# Patient Record
Sex: FEMALE | Race: WHITE | NOT HISPANIC OR LATINO | Employment: UNEMPLOYED | URBAN - METROPOLITAN AREA
[De-identification: names, ages, dates, MRNs, and addresses within clinical notes are randomized per-mention and may not be internally consistent; named-entity substitution may affect disease eponyms.]

---

## 2023-06-07 ENCOUNTER — TELEPHONE (OUTPATIENT)
Dept: PRIMARY CARE | Facility: CLINIC | Age: 63
End: 2023-06-07
Payer: COMMERCIAL

## 2023-06-07 NOTE — TELEPHONE ENCOUNTER
Former pt of Dr. Deras. Pt no longer wants to see that Dr. She would like to see you, but has several questions.    Are you taking new pts?    What is the cost of a visit (new pt)?    She has several health problems and she prefers holistic remedies, not pills. Are you willing to work with her on that?    Her  is hard of hearing. Will you be able to speak loudly and clearly for him to hear and understand you?    How old are you? Are you planning to leave or retire soon?    Please advise.

## 2023-09-27 PROBLEM — K62.5 ANAL BLEEDING: Status: ACTIVE | Noted: 2023-09-27

## 2023-09-27 PROBLEM — G56.01 CARPAL TUNNEL SYNDROME OF RIGHT WRIST: Status: ACTIVE | Noted: 2023-09-27

## 2023-09-27 PROBLEM — G56.02 CARPAL TUNNEL SYNDROME OF LEFT WRIST: Status: ACTIVE | Noted: 2023-09-27

## 2023-09-27 RX ORDER — ERGOCALCIFEROL 1.25 MG/1
1 CAPSULE ORAL
COMMUNITY
Start: 2018-04-04 | End: 2023-11-14

## 2023-09-27 RX ORDER — NYSTATIN 100MM UNIT
POWDER (EA) MISCELLANEOUS
Status: ON HOLD | COMMUNITY
Start: 2018-04-19 | End: 2023-11-22 | Stop reason: ALTCHOICE

## 2023-09-27 RX ORDER — CITALOPRAM 10 MG/1
1.5 TABLET ORAL DAILY
COMMUNITY
Start: 2018-04-04 | End: 2023-10-03 | Stop reason: WASHOUT

## 2023-10-03 ENCOUNTER — OFFICE VISIT (OUTPATIENT)
Dept: HEMATOLOGY/ONCOLOGY | Facility: CLINIC | Age: 63
End: 2023-10-03
Payer: COMMERCIAL

## 2023-10-03 VITALS
DIASTOLIC BLOOD PRESSURE: 67 MMHG | OXYGEN SATURATION: 94 % | HEART RATE: 65 BPM | BODY MASS INDEX: 35.79 KG/M2 | WEIGHT: 222.66 LBS | TEMPERATURE: 97.7 F | SYSTOLIC BLOOD PRESSURE: 119 MMHG | RESPIRATION RATE: 16 BRPM | HEIGHT: 66 IN

## 2023-10-03 DIAGNOSIS — E07.89 THYROID MASS OF UNCLEAR ETIOLOGY: Primary | ICD-10-CM

## 2023-10-03 DIAGNOSIS — C91.10 CLL (CHRONIC LYMPHOCYTIC LEUKEMIA) (MULTI): ICD-10-CM

## 2023-10-03 PROCEDURE — 1036F TOBACCO NON-USER: CPT | Performed by: INTERNAL MEDICINE

## 2023-10-03 PROCEDURE — 99213 OFFICE O/P EST LOW 20 MIN: CPT | Performed by: INTERNAL MEDICINE

## 2023-10-03 PROCEDURE — 99203 OFFICE O/P NEW LOW 30 MIN: CPT | Performed by: INTERNAL MEDICINE

## 2023-10-03 RX ORDER — MONTELUKAST SODIUM 10 MG/1
10 TABLET ORAL DAILY
COMMUNITY
Start: 2023-09-25

## 2023-10-03 RX ORDER — CITALOPRAM 20 MG/1
20 TABLET, FILM COATED ORAL DAILY
COMMUNITY
Start: 2023-09-25

## 2023-10-03 ASSESSMENT — COLUMBIA-SUICIDE SEVERITY RATING SCALE - C-SSRS
1. IN THE PAST MONTH, HAVE YOU WISHED YOU WERE DEAD OR WISHED YOU COULD GO TO SLEEP AND NOT WAKE UP?: NO
2. HAVE YOU ACTUALLY HAD ANY THOUGHTS OF KILLING YOURSELF?: NO
6. HAVE YOU EVER DONE ANYTHING, STARTED TO DO ANYTHING, OR PREPARED TO DO ANYTHING TO END YOUR LIFE?: NO

## 2023-10-03 ASSESSMENT — ENCOUNTER SYMPTOMS
OCCASIONAL FEELINGS OF UNSTEADINESS: 0
LOSS OF SENSATION IN FEET: 0
DEPRESSION: 0

## 2023-10-03 ASSESSMENT — PATIENT HEALTH QUESTIONNAIRE - PHQ9
1. LITTLE INTEREST OR PLEASURE IN DOING THINGS: NOT AT ALL
SUM OF ALL RESPONSES TO PHQ9 QUESTIONS 1 AND 2: 0
2. FEELING DOWN, DEPRESSED OR HOPELESS: NOT AT ALL

## 2023-10-03 ASSESSMENT — PAIN SCALES - GENERAL: PAINLEVEL: 0-NO PAIN

## 2023-10-03 NOTE — PATIENT INSTRUCTIONS
Please call our office for any CLL concerns or if your PCP should advise a specialist appointment.

## 2023-10-03 NOTE — PROGRESS NOTES
"Patient ID: Amita Ponce is a 63 y.o. female.  Referring Physician: No referring provider defined for this encounter.  Primary Care Provider: Aydee Deras MD      Subjective    HPI  63-year-old female presented for first time visit after 4 years lost to follow-up.  She was diagnosed with chronic lymphocytic leukemia in 2012 and was treated by Dr. Cadet and at [facility] over that entire..  Time.  Clinically as per history she was deemed Echavarria stage 0 and management was expectant and patient has been on all of his observation.  Her most recent WBC performed August 2023 she was 15.4.  She has not had a bone marrow biopsy no lymph node biopsy and patient is a strong advocate for holistic medicine and has been to see certain holistic and naturopathic practitioner practitioners.  In December 2022 patient noticed right-sided neck lump which over the ensuing months has increased in size.  She presents today because this mass now is impairing breathing and swallowing.    I have reviewed patient's CT scan of the neck which shows cystic thyroid lesions lymph nodes in the mediastinum with decrease in size.  Clinically therefore patient has a thyroid mass/thyroid lesion and patient has been referred to otorhinolaryngology for biopsy and possible surgical management if so indicated.  She is to return to heme-onc on a as needed basis.    Review of Systems - Oncology     Difficulty swallowing and breathing but not significantly impaired.  External mass right side of neck measuring at least 10 cm on by 10 cm.    Respiratory good air entry.    Abdomen is soft nontender no palpable masses.    CNS alert and oriented to time place and person cranial nerve function intact sensorimotor function is intact.    Objective   BSA: 2.17 meters squared  /67 (BP Location: Left arm)   Pulse 65   Temp 36.5 °C (97.7 °F)   Resp 16   Ht 1.684 m (5' 6.3\")   Wt 101 kg (222 lb 10.6 oz)   SpO2 94%   BMI 35.62 kg/m²     Family History "   Problem Relation Name Age of Onset    Lung cancer Mother      Colon cancer Mother      Colon cancer Maternal Grandmother       Oncology History    No history exists.   See HPI.  Diagnosed with chronic lymphocytic leukemia in 2012.  Echavarria stage 0.  No biopsy no bone marrow biopsy no flow cytometric evidence of diagnosis.  Management has been expectant and watchful waiting.  Amita Ponce  reports that she has quit smoking. Her smoking use included cigarettes. She has never been exposed to tobacco smoke. She has never used smokeless tobacco.  She  reports no history of alcohol use.  She  reports no history of drug use.    Physical Exam  Constitutional:       Appearance: Normal appearance. She is normal weight.   HENT:      Head: Normocephalic and atraumatic.      Right Ear: Tympanic membrane normal.      Left Ear: Tympanic membrane normal.      Nose: Nose normal.      Mouth/Throat:      Mouth: Mucous membranes are moist.   Eyes:      Extraocular Movements: Extraocular movements intact.      Pupils: Pupils are equal, round, and reactive to light.   Neck:      Comments: Patient with a large 10 cm right-sided thyroid mass.  Patient complains of some impairment of swallowing and breathing.  Cystic in nature.  Easily mobile and not tethered to the skin.  Unable to palpate surrounding lymph nodes.  Clinically most consistent with a thyroid cystic mass.  Referral to ENT surgery  Cardiovascular:      Rate and Rhythm: Normal rate and regular rhythm.   Pulmonary:      Effort: Pulmonary effort is normal.      Breath sounds: Normal breath sounds.   Abdominal:      General: Abdomen is flat.      Palpations: Abdomen is soft.   Musculoskeletal:         General: Normal range of motion.   Skin:     General: Skin is warm and dry.   Neurological:      Mental Status: She is alert.       Performance Status:  Symptomatic; fully ambulatory    Assessment/Plan    63-year-old female with a neck mass history of CLL.  Clinically thyroid cystic  mass.     Patient with a large 10 cm right-sided thyroid mass.  Evaluation because of previous history of CLL.  Is negative for evidence of neoplastic lymphoid progression.  CT of the neck is negative for evidence of increased size of lymph nodes and white cell count is also well within normal with no evidence of progression.    Patient is being referred to ENT for biopsy and possible surgical intervention.  Patient is to return to oncology on a as needed basis as patient is history in regard to follow-up is rather unreliable.             RTC PRN    Halie Howard MD

## 2023-10-10 NOTE — PROGRESS NOTES
HPI  Amita Hoang a 63 y.o.old female referred to me today by aHlie Howard MD for further evaluation and treatment of a large right neck mass. She has a history of CLL and was referred by her oncologist for follow up and biopsy.  I personally reviewed her CT from 9/8/23 which shows a new cystic mass which measures 5.2x4.1x5.6cm.  Contained within the cystic neck mass there is an enhancing nodule measuring 1.2 x 0.8 x 1.3 cm. The inferior component of the mass  either abuts or arises from the right thyroid lobe. The thyroid gland is diffusely heterogeneous, right greater than left but can't see a discrete nodule.  There is an additional cystic appearing node at level 2 on the right measuring 1.5 x 0.8 cm. There are prominent lymph nodes bilaterally likely due to her CLL which is unchanged compared to a CT from 10yrs ago 2012.      She first noticed a small nodule in her right neck in January, reports it has gotten bigger since. Associated symptoms include pressure, globus sensation, throat pain, deeper voice, and discomfort when turning head to the left. She reports her mother had a thyroidectomy as a young adult, but she is not sure why.     PMH  Past Medical History:   Diagnosis Date    Personal history of leukemia     History of chronic lymphocytic leukemia    Unspecified visual loss     Vision loss        PSH  Past Surgical History:   Procedure Laterality Date    GALLBLADDER SURGERY  04/04/2018    Gallbladder Surgery    HEMORRHOID SURGERY  04/04/2018    Hemorrhoidectomy    HYSTERECTOMY  04/04/2018    Hysterectomy        ROS  Review of Systems   Constitutional:  Positive for fatigue.   Respiratory:  Negative for choking and stridor.    Musculoskeletal:  Positive for neck pain.   Neurological:  Negative for dizziness.        Deeper voice   Psychiatric/Behavioral:  The patient is nervous/anxious.         PE  PHYSICAL EXAMINATION:  Constitutional:  No acute distress  Voice:  No hoarseness or other  abnormality  Respiration:  Breathing comfortably, no stridor  Cardiovascular:  No clubbing/cyanosis/edema in hands  Eyes:  EOM intact, sclera normal  Neuro:  Alert and oriented times 3, Cranial nerves II-XII grossly intact and symmetric bilaterally  Head and Face:  Symmetric facial features, no masses or lesions,  Salivary Glands:  Parotid and submandibular glands normal bilaterally  Right Ear:  Normal external ear, external auditory canal, and TM to otoscopy, normal hearing to whispered voice.  Left Ear: Normal external ear, external auditory canal, and TM to otoscopy, normal hearing to whispered voice.  Nose:  External nose midline, anterior rhinoscopy is normal with limited visualization to the anterior aspect of the interior turbinates, no bleeding or drainage, no lesions  Oral Cavity/Oropharynx/Lips:  Normal mucous membranes, normal floor of mouth/tongue/OP, no masses or lesions, missing several teeth   Pharynx: no masses or lesions  Neck/Lymph:  5cm neck mass right neck, firm, not mobile except that it elevates when swallowing, soft, fluctuant (fluid filled), mildly tender to palpation,   Skin:  Neck skin is without scar or injury  Psych:  Alert and oriented with appropriate mood and affect      ASSESSMENT AND PLAN  Problem List Items Addressed This Visit          ENT    Neck mass    Current Assessment & Plan     - large right cystic neck mass  - US thyroid, US FNA of R neck mass and thyroid ordered          Relevant Orders    US thyroid    US guided thyroid biopsy    IR biopsy neck lymph node     Other Visit Diagnoses       Thyroid mass of unclear etiology        Relevant Orders    US thyroid    US guided thyroid biopsy    IR biopsy neck lymph node               Recs:  - Thyroid ultrasound and US-guided FNA of thyroid as well as cystic mass   - Return for follow up after biopsy   - Will need flexible laryngoscopy next visit for pre op eval of vocal cords     Concern for possible thyroid cancer, recommend plan  as above.  I did explain the diagnosis, possible alternative diagnoses, and the plans for evaluation and treatment of the problem.  I answered all of the patients questions.  They did appear to understand and confirmed this, and do agree to proceed as outlined above.      Vandana Dalton MD    Head & Neck Surgical Oncology & Reconstruction  Department of Otolaryngology - Head and Neck Surgery      By signing my name below, I, Snehal Brennanibe, attest that this documentation has been prepared under the direction and in the presence of Dr. Vandana Dalton MD.     All medical record entries made by the Scribe were at my direction and personally dictated by me, Dr. Vandana Dalton. I have reviewed the chart and agree that the record accurately reflects my personal performance of the history, physical exam, discussion and plan.

## 2023-10-11 ENCOUNTER — OFFICE VISIT (OUTPATIENT)
Dept: OTOLARYNGOLOGY | Facility: HOSPITAL | Age: 63
End: 2023-10-11
Payer: COMMERCIAL

## 2023-10-11 VITALS — HEIGHT: 65 IN | BODY MASS INDEX: 40.47 KG/M2 | TEMPERATURE: 97.3 F | WEIGHT: 242.9 LBS

## 2023-10-11 DIAGNOSIS — R22.1 NECK MASS: ICD-10-CM

## 2023-10-11 DIAGNOSIS — E07.89 THYROID MASS OF UNCLEAR ETIOLOGY: ICD-10-CM

## 2023-10-11 PROCEDURE — 99214 OFFICE O/P EST MOD 30 MIN: CPT | Performed by: OTOLARYNGOLOGY

## 2023-10-11 PROCEDURE — 1036F TOBACCO NON-USER: CPT | Performed by: OTOLARYNGOLOGY

## 2023-10-11 PROCEDURE — 99204 OFFICE O/P NEW MOD 45 MIN: CPT | Performed by: OTOLARYNGOLOGY

## 2023-10-11 ASSESSMENT — PATIENT HEALTH QUESTIONNAIRE - PHQ9
1. LITTLE INTEREST OR PLEASURE IN DOING THINGS: NOT AT ALL
2. FEELING DOWN, DEPRESSED OR HOPELESS: NOT AT ALL
SUM OF ALL RESPONSES TO PHQ9 QUESTIONS 1 & 2: 0

## 2023-10-11 ASSESSMENT — ENCOUNTER SYMPTOMS
NECK PAIN: 1
DIZZINESS: 0
CHOKING: 0
FATIGUE: 1
STRIDOR: 0
NERVOUS/ANXIOUS: 1

## 2023-10-11 NOTE — PATIENT INSTRUCTIONS
Dear Amita Ponce    Welcome to Dr. [facility],    Dr. Dalton is a Head and neck oncologist and reconstructive surgeon. This means that she specializes in caring for patients with complex head and neck problems such as cancer, however,you may be seeing her for another reason.      Dr. Dalton's office number is [phone] option #2.  While you may see her at a satellite office, she has a team committed to help meet your healthcare needs at [facility].  This number listed, is the most direct way to communicate with her office.      Dr. Dalton's  answers the office phone from 8am-4pm Monday-Friday.  She can help you with many general questions and information.  Questions that she may not be able to answer will be directed to the appropriate staff.  You will need to leave a message and someone from the team will call you back.     Chloe and Chari are Dr. Dalton's primary nurses. They can both be reached by calling the office as well. Chloe is in clinic on Mondays and Wednesdays with Dr. Dalton. Chloe is out of the office on Tuesday's and Friday's, but Chrai is in the office on Tuesdays and Fridays and will be covering all patient concerns. Please be mindful that all non urgent calls will be returned within 24 hours of the call.     Sometimes, other team members will also be involved in your care.  These people may include dieticians, social workers, speech therapists, medical oncologists, or radiation oncologists.  Dr. Dalton will provide these referrals for you as needed.      For your connivence, Dr. Dalton sees patients at several [facility] locations. These locations are [facility], [facility], and [facility] the [facility].  While we try to make your appointment as convenient as possible, occasionally a visit to another location may be necessary to provide you with the best care.      We look  forward to working with you to meet your healthcare goals.    Dr. Dalton evaluated you today.    Your care plan is outlined below:  -- Dr. Dalton recommends a thyroid US. This can be scheduled on your way out today, by calling [phone], or by accessing the order on your my chart. Please schedule this test at your earliest convenience.   -- Dr. Dalton recommends a biopsy of the thyroid nodule and R neck mass. Please call interventional radiology to schedule the biopsy. The phone number is [phone].   -- Follow up with Dr. Dalton after your biopsy.  This appointment was scheduled at the end of your visit today.  If you need to reschedule, please call the office at [phone].  Please keep in mind that last minute cancellations often result in delayed follow-up appointments.     General appointment line please call [phone]  For general questions or scheduling issues please call [phone] option #2   For medical questions or surgery scheduling please call [phone] on Mondays, Wednesdays and Thursdays or [phone] on Tuesdays and Fridays. Please be sure to leave a voice mail or your call will not be able to be returned.     Dr. Dalton makes every effort to run on time for your appointments.  Therefore, if you are more than 30 minutes late for your appointment, unrelated to a scan or another appointment such as chemotherapy or radiation, your appointment will need to be rescheduled to another day.  We appreciate your understanding.

## 2023-10-11 NOTE — LETTER
October 11, 2023     Halie Howard MD  [address] [facility] [address]    Patient: Amita Ponce   YOB: 1960   Date of Visit: 10/11/2023       Dear Dr. Halie Howard MD:    Thank you for referring Amita Ponce to me for evaluation. Below are my notes for this consultation.  If you have questions, please do not hesitate to call me. I look forward to following your patient along with you.       Sincerely,     Vandana Dalton MD      CC: No Recipients  ______________________________________________________________________________________    HPI  Amita Ponceis a 63 y.o.old female referred to me today by Halie Howard MD for further evaluation and treatment of a large right neck mass. She has a history of CLL and was referred by her oncologist for follow up and biopsy.  I personally reviewed her CT from 9/8/23 which shows a new cystic mass which measures 5.2x4.1x5.6cm.  Contained within the cystic neck mass there is an enhancing nodule measuring 1.2 x 0.8 x 1.3 cm. The inferior component of the mass  either abuts or arises from the right thyroid lobe. The thyroid gland is diffusely heterogeneous, right greater than left but can't see a discrete nodule.  There is an additional cystic appearing node at level 2 on the right measuring 1.5 x 0.8 cm. There are prominent lymph nodes bilaterally likely due to her CLL which is unchanged compared to a CT from 10yrs ago 2012.      She first noticed a small nodule in her right neck in January, reports it has gotten bigger since. Associated symptoms include pressure, globus sensation, throat pain, deeper voice, and discomfort when turning head to the left. She reports her mother had a thyroidectomy as a young adult, but she is not sure why.     PMH  Past Medical History:   Diagnosis Date   • Personal history of leukemia     History of chronic lymphocytic leukemia   • Unspecified visual loss     Vision loss        PSH  Past  Surgical History:   Procedure Laterality Date   • GALLBLADDER SURGERY  04/04/2018    Gallbladder Surgery   • HEMORRHOID SURGERY  04/04/2018    Hemorrhoidectomy   • HYSTERECTOMY  04/04/2018    Hysterectomy        ROS  Review of Systems   Constitutional:  Positive for fatigue.   Respiratory:  Negative for choking and stridor.    Musculoskeletal:  Positive for neck pain.   Neurological:  Negative for dizziness.        Deeper voice   Psychiatric/Behavioral:  The patient is nervous/anxious.         PE  PHYSICAL EXAMINATION:  Constitutional:  No acute distress  Voice:  No hoarseness or other abnormality  Respiration:  Breathing comfortably, no stridor  Cardiovascular:  No clubbing/cyanosis/edema in hands  Eyes:  EOM intact, sclera normal  Neuro:  Alert and oriented times 3, Cranial nerves II-XII grossly intact and symmetric bilaterally  Head and Face:  Symmetric facial features, no masses or lesions,  Salivary Glands:  Parotid and submandibular glands normal bilaterally  Right Ear:  Normal external ear, external auditory canal, and TM to otoscopy, normal hearing to whispered voice.  Left Ear: Normal external ear, external auditory canal, and TM to otoscopy, normal hearing to whispered voice.  Nose:  External nose midline, anterior rhinoscopy is normal with limited visualization to the anterior aspect of the interior turbinates, no bleeding or drainage, no lesions  Oral Cavity/Oropharynx/Lips:  Normal mucous membranes, normal floor of mouth/tongue/OP, no masses or lesions, missing several teeth   Pharynx: no masses or lesions  Neck/Lymph:  5cm neck mass right neck, firm, not mobile except that it elevates when swallowing, soft, fluctuant (fluid filled), mildly tender to palpation,   Skin:  Neck skin is without scar or injury  Psych:  Alert and oriented with appropriate mood and affect      ASSESSMENT AND PLAN  Problem List Items Addressed This Visit          ENT    Neck mass    Current Assessment & Plan     - large right  cystic neck mass  - US thyroid, US FNA of R neck mass and thyroid ordered          Relevant Orders    US thyroid    US guided thyroid biopsy    IR biopsy neck lymph node     Other Visit Diagnoses       Thyroid mass of unclear etiology        Relevant Orders    US thyroid    US guided thyroid biopsy    IR biopsy neck lymph node               Recs:  - Thyroid ultrasound and US-guided FNA of thyroid as well as cystic mass   - Return for follow up after biopsy   - Will need flexible laryngoscopy next visit for pre op eval of vocal cords     Concern for possible thyroid cancer, recommend plan as above.  I did explain the diagnosis, possible alternative diagnoses, and the plans for evaluation and treatment of the problem.  I answered all of the patients questions.  They did appear to understand and confirmed this, and do agree to proceed as outlined above.      Vandana Dalton MD    Head & Neck Surgical Oncology & Reconstruction  Department of Otolaryngology - Head and Neck Surgery      By signing my name below, I, Snehal Brennanibe, attest that this documentation has been prepared under the direction and in the presence of Dr. Vandana Dalton MD.     All medical record entries made by the Scribe were at my direction and personally dictated by me, Dr. Vandana Dalton. I have reviewed the chart and agree that the record accurately reflects my personal performance of the history, physical exam, discussion and plan.

## 2023-10-20 ENCOUNTER — PREP FOR PROCEDURE (OUTPATIENT)
Dept: RADIOLOGY | Facility: HOSPITAL | Age: 63
End: 2023-10-20

## 2023-10-20 ENCOUNTER — HOSPITAL ENCOUNTER (OUTPATIENT)
Dept: RADIOLOGY | Facility: HOSPITAL | Age: 63
Discharge: HOME | End: 2023-10-20
Payer: COMMERCIAL

## 2023-10-20 VITALS
DIASTOLIC BLOOD PRESSURE: 70 MMHG | HEART RATE: 66 BPM | RESPIRATION RATE: 16 BRPM | SYSTOLIC BLOOD PRESSURE: 108 MMHG | TEMPERATURE: 97.7 F | OXYGEN SATURATION: 95 %

## 2023-10-20 DIAGNOSIS — E07.89 THYROID MASS OF UNCLEAR ETIOLOGY: ICD-10-CM

## 2023-10-20 DIAGNOSIS — R22.1 NECK MASS: ICD-10-CM

## 2023-10-20 PROCEDURE — 88305 TISSUE EXAM BY PATHOLOGIST: CPT | Performed by: PATHOLOGY

## 2023-10-20 PROCEDURE — 88173 CYTOPATH EVAL FNA REPORT: CPT | Performed by: PATHOLOGY

## 2023-10-20 PROCEDURE — 99152 MOD SED SAME PHYS/QHP 5/>YRS: CPT

## 2023-10-20 PROCEDURE — 76942 ECHO GUIDE FOR BIOPSY: CPT

## 2023-10-20 PROCEDURE — 76536 US EXAM OF HEAD AND NECK: CPT | Performed by: RADIOLOGY

## 2023-10-20 PROCEDURE — 60100 BIOPSY OF THYROID: CPT | Performed by: RADIOLOGY

## 2023-10-20 PROCEDURE — 88112 CYTOPATH CELL ENHANCE TECH: CPT | Mod: TC,MCY | Performed by: STUDENT IN AN ORGANIZED HEALTH CARE EDUCATION/TRAINING PROGRAM

## 2023-10-20 PROCEDURE — 77002 NEEDLE LOCALIZATION BY XRAY: CPT

## 2023-10-20 PROCEDURE — 10005 FNA BX W/US GDN 1ST LES: CPT

## 2023-10-20 PROCEDURE — 2500000004 HC RX 250 GENERAL PHARMACY W/ HCPCS (ALT 636 FOR OP/ED): Performed by: RADIOLOGY

## 2023-10-20 PROCEDURE — 88305 TISSUE EXAM BY PATHOLOGIST: CPT | Mod: TC,SUR | Performed by: STUDENT IN AN ORGANIZED HEALTH CARE EDUCATION/TRAINING PROGRAM

## 2023-10-20 PROCEDURE — 2720000007 HC OR 272 NO HCPCS

## 2023-10-20 PROCEDURE — 76942 ECHO GUIDE FOR BIOPSY: CPT | Performed by: RADIOLOGY

## 2023-10-20 PROCEDURE — 10160 PNXR ASPIR ABSC HMTMA BULLA: CPT

## 2023-10-20 PROCEDURE — 99152 MOD SED SAME PHYS/QHP 5/>YRS: CPT | Performed by: RADIOLOGY

## 2023-10-20 PROCEDURE — 76536 US EXAM OF HEAD AND NECK: CPT

## 2023-10-20 RX ORDER — MIDAZOLAM HYDROCHLORIDE 1 MG/ML
INJECTION INTRAMUSCULAR; INTRAVENOUS AS NEEDED
Status: COMPLETED | OUTPATIENT
Start: 2023-10-20 | End: 2023-10-20

## 2023-10-20 RX ORDER — FENTANYL CITRATE 50 UG/ML
INJECTION, SOLUTION INTRAMUSCULAR; INTRAVENOUS AS NEEDED
Status: COMPLETED | OUTPATIENT
Start: 2023-10-20 | End: 2023-10-20

## 2023-10-20 RX ADMIN — MIDAZOLAM HYDROCHLORIDE 1 MG: 1 INJECTION, SOLUTION INTRAMUSCULAR; INTRAVENOUS at 14:14

## 2023-10-20 RX ADMIN — FENTANYL CITRATE 50 MCG: 50 INJECTION, SOLUTION INTRAMUSCULAR; INTRAVENOUS at 14:08

## 2023-10-20 RX ADMIN — MIDAZOLAM HYDROCHLORIDE 1 MG: 1 INJECTION, SOLUTION INTRAMUSCULAR; INTRAVENOUS at 14:08

## 2023-10-20 RX ADMIN — FENTANYL CITRATE 50 MCG: 50 INJECTION, SOLUTION INTRAMUSCULAR; INTRAVENOUS at 14:14

## 2023-10-20 ASSESSMENT — PAIN SCALES - GENERAL
PAINLEVEL_OUTOF10: 0 - NO PAIN

## 2023-10-20 ASSESSMENT — PAIN - FUNCTIONAL ASSESSMENT
PAIN_FUNCTIONAL_ASSESSMENT: 0-10

## 2023-10-20 NOTE — PRE-PROCEDURE NOTE
Interventional Radiology Preprocedure Note    Indication for procedure: Diagnoses of Thyroid mass of unclear etiology and Neck mass were pertinent to this visit.    Relevant review of systems: NA    Relevant Labs:   Lab Results   Component Value Date    CREATININE 0.75 12/01/2020       Planned Sedation/Anesthesia: Moderate    Airway assessment: normal    Directed physical examination:    GEN: NAD, A&Ox3  CARD: RRR, normal S1 and S2, no MRG  PULM: CTAB  ABD: NTND  MSK: Full ROM  NEURO: Grossly Intact     Mallampati: II (hard and soft palate, upper portion of tonsils anduvula visible)    ASA Score: ASA 4 - Patient with severe systemic disease that is a constant threat to life    Benefits, risks and alternatives of procedure and planned sedation have been discussed with the patient and/or their representative. All questions answered and they agree to proceed.

## 2023-10-20 NOTE — Clinical Note
R thyroid bx performed. 2 core samples taken and ~35cc aspirate taken and sent to lab. Patient received 2mg versed, 100mcg fentanyl IVP for sedation.  Gauze and tegaderm placed R neck. No visible bleeding at site. VSS throughout procedure.

## 2023-10-20 NOTE — POST-PROCEDURE NOTE
Interventional Radiology Brief Postprocedure Note    Attending: Dr. Tara Peralta    Assistant: None    Diagnosis: Superior Left Thyroid Lobe Mass    Description of procedure: Using ultrasound guidance, a total of 3 passes were made with 25 gauge spinal needle to the right thyroid nodule. Subsequently, a total of 2 passes were made into the same nodule under ultrasound guidance using a 18 Gauge needle passed through a 17 gauge coaxial system. Additionally, approximately 35 mL of dark hemorrhagic fluid was aspirated from the mass. Scanning after each pass demonstrated no bleeding. Patient tolerated the procedure. Please refer to the full radiology report for further details.       Anesthesia:  Local    Complications: None    Estimated Blood Loss: minimal    Medications (Filter: Administrations occurring from 1356 to 1422 on 10/20/23) As of 10/20/23 1422      fentaNYL PF (Sublimaze) injection (mcg) Total dose:  100 mcg      Date/Time Rate/Dose/Volume Action       10/20/23  1408 50 mcg Given      1414 50 mcg Given               midazolam (Versed) injection (mg) Total dose:  2 mg      Date/Time Rate/Dose/Volume Action       10/20/23  1408 1 mg Given      1414 1 mg Given                       See detailed result report with images in PACS.    The patient tolerated the procedure well without incident or complication and is in stable condition.

## 2023-10-24 LAB
LABORATORY COMMENT REPORT: NORMAL
PATH REPORT.FINAL DX SPEC: NORMAL
PATH REPORT.GROSS SPEC: NORMAL
PATH REPORT.RELEVANT HX SPEC: NORMAL
PATH REPORT.TOTAL CANCER: NORMAL

## 2023-10-25 LAB
LABORATORY COMMENT REPORT: NORMAL
LABORATORY COMMENT REPORT: NORMAL
PATH REPORT.FINAL DX SPEC: NORMAL
PATH REPORT.GROSS SPEC: NORMAL
PATH REPORT.RELEVANT HX SPEC: NORMAL
PATH REPORT.TOTAL CANCER: NORMAL

## 2023-10-26 ENCOUNTER — TELEPHONE (OUTPATIENT)
Dept: OTOLARYNGOLOGY | Facility: HOSPITAL | Age: 63
End: 2023-10-26
Payer: COMMERCIAL

## 2023-10-26 NOTE — TELEPHONE ENCOUNTER
Called and reviewed her FNA results which were +papillary thyroid cancer.  She has follow up me next week.  We had already discussed the possibility that this was thyroid cancer.  We discussed the plan for thyroidectomy and neck dissection.  All questions answered.

## 2023-10-31 ASSESSMENT — ENCOUNTER SYMPTOMS
STRIDOR: 0
CHOKING: 0
NECK PAIN: 1
FATIGUE: 1
DIZZINESS: 0

## 2023-10-31 NOTE — PROGRESS NOTES
HPI  Amita Hoang a 63 y.o. female following up today.  Last seen 11/1/23.  Since last visit she had US guided FNA of her right neck mass which returned +papillary thyroid cancer. She is here to discuss next steps.  She is having more discomfort involving her neck.  She takes tylenol but there are times when it's severe.      History:  Dx: Papillary thyroid carcinoma  Dx: CLL  1/23: Noticed a small nodule in her right neck, inc size   9/23: CT neck +new cystic mass 5.2x4.1x5.6cm w/n +enhancing nodule 1.2 x 0.8 x 1.3 cm, abuts or arises from the right thyroid lobe. The thyroid gland is diffusely heterogeneous, right greater than left but can't see a discrete nodule.  There is an additional cystic appearing node at level 2 on the right measuring 1.5 x 0.8 cm.     ROS  Review of Systems   Constitutional:  Positive for fatigue.   Respiratory:  Negative for choking and stridor.    Musculoskeletal:  Positive for neck pain.   Neurological:  Negative for dizziness.        Deeper voice        PE  PHYSICAL EXAMINATION:  Constitutional:  No acute distress  Voice:  No hoarseness or other abnormality  Respiration:  Breathing comfortably, no stridor  Cardiovascular:  No clubbing/cyanosis/edema in hands  Eyes:  EOM intact, sclera normal  Neuro:  Alert and oriented times 3, Cranial nerves II-XII grossly intact and symmetric bilaterally  Head and Face:  Symmetric facial features, no masses or lesions,  Salivary Glands:  Parotid and submandibular glands normal bilaterally  Nose:  External nose midline, anterior rhinoscopy is normal with limited visualization to the anterior aspect of the interior turbinates, no bleeding or drainage, no lesions  Oral Cavity/Oropharynx/Lips:  MMM  Neck/Lymph:  5cm neck mass right neck, firm, not mobile except that it elevates when swallowing, soft, fluctuant (fluid filled), mildly tender to palpation,   Psych:  Alert and oriented with appropriate mood and affect      PROCEDURE NOTE:  Recommended  flexible nasopharyngoscopy & laryngoscopy.  Risks, benefits, personnel and alternatives were explained.  The patient wished to proceed.  S/he was re-identified.  PROCEDURE:  Flexible nasopharyngoscopy and laryngoscopy  PREOPERATIVE DIAGNOSIS: Thyroid cancer  POSTOPERATIVE DIAGNOSIS: Same as above, normal vocal fold mobility  INDICATIONS: Inability to tolerate mirror exam  ANESTHESIA: 4% lidocaine and 0.5% phenylephrine  PROCEDURE:  With the patient sitting upright topical anesthesia and vasoconstriction was applied with spray to the right side(s) of the nose.  After waiting an appropriate period of time for anesthesia/vasoconstriction to become effective, a flexible laryngoscope was passed through the right side(s) of the nose.  Nasopharynx, oropharynx, hypopharynx and larynx were examined.  FINDINGS:  The nasopharynx and oropharynx were normal without any lesions or masses visualized.  No masses or lesions were visualized at the base of tongue, vallecula, epiglottis, aryepiglottic folds, pyriform sinuses, and lateral pharyngeal walls.  True vocal fold movement was normal.  The patient's airway was widely patent with no evidence of obstruction.  Patient tolerated the procedure well, and there were no complications.      ASSESSMENT AND PLAN  Problem List Items Addressed This Visit       Neck mass    Current Assessment & Plan     Neck mass - +papillary thyroid cancer - see below, will require surgical excision         Papillary thyroid carcinoma (CMS/HCC) - Primary    Current Assessment & Plan     Papillary thyroid carcinoma with either extension off the thyroid to the right neck or right cervical lymph node metastasis.  Recommend proceeding with total thyroidectomy and right neck dissection.  Risks, benefits and alternatives were discussed including but not limited to neck scar, deformity/asymmetry, postoperative numbness, recurrent nerve weakness - both transient and permanent, hypocalcemia - both transient and  permanent, hoarseness, bleeding and infection.  I also reviewed the need for lifelong thyroid hormone supplementation.  Risks, benefits and alternatives were discussed at length regarding the above procedure(s).  The patient expressed verbal understanding of these and elected to sign a surgical consent today.  Follow up for surgery scheduling in the near future.   In addition the surgical procedure, perioperative expectations including an 2-3 stay and drain placement were all discussed.  The patient has elected to proceed and did sign a surgical consent today.  Follow up for surgery in the near future.     She will follow up with me after surgery.         Relevant Medications    traMADol (Ultram) 50 mg tablet    Other Relevant Orders    Case Request Operating Room: Thyroidectomy, Dissection Neck (Completed)          Recs:  - Thyroid ultrasound and US-guided FNA of thyroid as well as cystic mass   - Return for follow up after biopsy   - Will need flexible laryngoscopy next visit for pre op eval of vocal cords     Concern for possible thyroid cancer, recommend plan as above.  I did explain the diagnosis, possible alternative diagnoses, and the plans for evaluation and treatment of the problem.  I answered all of the patients questions.  They did appear to understand and confirmed this, and do agree to proceed as outlined above.      Vandana Dalton MD    Head & Neck Surgical Oncology & Reconstruction  Department of Otolaryngology - Head and Neck Surgery      By signing my name below, I, Jensen Brennan, attest that this documentation has been prepared under the direction and in the presence of Dr. Vandana Dalton MD.     All medical record entries made by the Scribe were at my direction and personally dictated by me, Dr. Vandana Dalton. I have reviewed the chart and agree that the record accurately reflects my personal performance of the history, physical exam, discussion and plan.     Procedures  ENT Physical  Exam

## 2023-11-01 ENCOUNTER — OFFICE VISIT (OUTPATIENT)
Dept: OTOLARYNGOLOGY | Facility: HOSPITAL | Age: 63
End: 2023-11-01
Payer: COMMERCIAL

## 2023-11-01 VITALS — WEIGHT: 244.7 LBS | BODY MASS INDEX: 40.77 KG/M2 | TEMPERATURE: 97.9 F | HEIGHT: 65 IN

## 2023-11-01 DIAGNOSIS — R22.1 NECK MASS: ICD-10-CM

## 2023-11-01 DIAGNOSIS — C73 PAPILLARY THYROID CARCINOMA (MULTI): Primary | ICD-10-CM

## 2023-11-01 PROCEDURE — 92511 NASOPHARYNGOSCOPY: CPT | Performed by: OTOLARYNGOLOGY

## 2023-11-01 PROCEDURE — 99214 OFFICE O/P EST MOD 30 MIN: CPT | Performed by: OTOLARYNGOLOGY

## 2023-11-01 PROCEDURE — 1036F TOBACCO NON-USER: CPT | Performed by: OTOLARYNGOLOGY

## 2023-11-01 RX ORDER — CETIRIZINE HYDROCHLORIDE 10 MG/1
10 TABLET ORAL DAILY PRN
COMMUNITY
Start: 2016-11-15 | End: 2023-12-21 | Stop reason: WASHOUT

## 2023-11-01 RX ORDER — CHOLECALCIFEROL (VITAMIN D3) 25 MCG
3000 TABLET ORAL
COMMUNITY
End: 2023-12-21 | Stop reason: WASHOUT

## 2023-11-01 ASSESSMENT — PATIENT HEALTH QUESTIONNAIRE - PHQ9
2. FEELING DOWN, DEPRESSED OR HOPELESS: NOT AT ALL
SUM OF ALL RESPONSES TO PHQ9 QUESTIONS 1 & 2: 0
1. LITTLE INTEREST OR PLEASURE IN DOING THINGS: NOT AT ALL

## 2023-11-03 ENCOUNTER — TELEPHONE (OUTPATIENT)
Dept: OPERATING ROOM | Facility: HOSPITAL | Age: 63
End: 2023-11-03
Payer: COMMERCIAL

## 2023-11-03 RX ORDER — TRAMADOL HYDROCHLORIDE 50 MG/1
50 TABLET ORAL EVERY 6 HOURS PRN
Qty: 56 TABLET | Refills: 0 | Status: SHIPPED | OUTPATIENT
Start: 2023-11-03 | End: 2023-11-25 | Stop reason: HOSPADM

## 2023-11-03 NOTE — TELEPHONE ENCOUNTER
Called patient back to let her know we would be moving her surgery to 11/22 as she requested. She also let me know that Dr. Dalton was supposed to call her in tramadol on Wednesday but never did. I let her know I would reach out and have the prescription called in and let her know on mychart. She also had some questions about radioactive iodine that I was unable to answer at this time and advised her to follow up with the team that would be treating her for that when it came time as I did not want to give her incorrect information. Patient in agreement with plan and has our number to call with further concerns.

## 2023-11-05 NOTE — ASSESSMENT & PLAN NOTE
Papillary thyroid carcinoma with either extension off the thyroid to the right neck or right cervical lymph node metastasis.  Recommend proceeding with total thyroidectomy and right neck dissection.  Risks, benefits and alternatives were discussed including but not limited to neck scar, deformity/asymmetry, postoperative numbness, recurrent nerve weakness - both transient and permanent, hypocalcemia - both transient and permanent, hoarseness, bleeding and infection.  I also reviewed the need for lifelong thyroid hormone supplementation.  Risks, benefits and alternatives were discussed at length regarding the above procedure(s).  The patient expressed verbal understanding of these and elected to sign a surgical consent today.  Follow up for surgery scheduling in the near future.   In addition the surgical procedure, perioperative expectations including an 2-3 stay and drain placement were all discussed.  The patient has elected to proceed and did sign a surgical consent today.  Follow up for surgery in the near future.     She will follow up with me after surgery.

## 2023-11-06 ENCOUNTER — APPOINTMENT (OUTPATIENT)
Dept: OTOLARYNGOLOGY | Facility: CLINIC | Age: 63
End: 2023-11-06
Payer: COMMERCIAL

## 2023-11-14 ENCOUNTER — TELEPHONE (OUTPATIENT)
Dept: OPERATING ROOM | Facility: HOSPITAL | Age: 63
End: 2023-11-14

## 2023-11-14 ENCOUNTER — PRE-ADMISSION TESTING (OUTPATIENT)
Dept: PREADMISSION TESTING | Facility: HOSPITAL | Age: 63
End: 2023-11-14
Payer: COMMERCIAL

## 2023-11-14 VITALS
HEART RATE: 65 BPM | DIASTOLIC BLOOD PRESSURE: 72 MMHG | BODY MASS INDEX: 39.05 KG/M2 | SYSTOLIC BLOOD PRESSURE: 114 MMHG | HEIGHT: 66 IN | WEIGHT: 243 LBS | TEMPERATURE: 98 F | OXYGEN SATURATION: 98 %

## 2023-11-14 DIAGNOSIS — Z01.818 PREOPERATIVE EXAMINATION: Primary | ICD-10-CM

## 2023-11-14 LAB
ABO GROUP (TYPE) IN BLOOD: NORMAL
ANION GAP SERPL CALC-SCNC: 10 MMOL/L (ref 10–20)
ANTIBODY SCREEN: NORMAL
BUN SERPL-MCNC: 15 MG/DL (ref 6–23)
CALCIUM SERPL-MCNC: 10.4 MG/DL (ref 8.6–10.6)
CHLORIDE SERPL-SCNC: 105 MMOL/L (ref 98–107)
CO2 SERPL-SCNC: 31 MMOL/L (ref 21–32)
CREAT SERPL-MCNC: 0.63 MG/DL (ref 0.5–1.05)
ERYTHROCYTE [DISTWIDTH] IN BLOOD BY AUTOMATED COUNT: 13.9 % (ref 11.5–14.5)
GFR SERPL CREATININE-BSD FRML MDRD: >90 ML/MIN/1.73M*2
GLUCOSE SERPL-MCNC: 76 MG/DL (ref 74–99)
HCT VFR BLD AUTO: 41.1 % (ref 36–46)
HGB BLD-MCNC: 13 G/DL (ref 12–16)
MCH RBC QN AUTO: 28.6 PG (ref 26–34)
MCHC RBC AUTO-ENTMCNC: 31.6 G/DL (ref 32–36)
MCV RBC AUTO: 91 FL (ref 80–100)
NRBC BLD-RTO: 0 /100 WBCS (ref 0–0)
PLATELET # BLD AUTO: 266 X10*3/UL (ref 150–450)
POTASSIUM SERPL-SCNC: 4.7 MMOL/L (ref 3.5–5.3)
RBC # BLD AUTO: 4.54 X10*6/UL (ref 4–5.2)
RH FACTOR (ANTIGEN D): NORMAL
SODIUM SERPL-SCNC: 141 MMOL/L (ref 136–145)
WBC # BLD AUTO: 14.5 X10*3/UL (ref 4.4–11.3)

## 2023-11-14 PROCEDURE — 86900 BLOOD TYPING SEROLOGIC ABO: CPT

## 2023-11-14 PROCEDURE — 85027 COMPLETE CBC AUTOMATED: CPT

## 2023-11-14 PROCEDURE — 36415 COLL VENOUS BLD VENIPUNCTURE: CPT

## 2023-11-14 PROCEDURE — 80048 BASIC METABOLIC PNL TOTAL CA: CPT

## 2023-11-14 PROCEDURE — 99204 OFFICE O/P NEW MOD 45 MIN: CPT | Performed by: NURSE PRACTITIONER

## 2023-11-14 ASSESSMENT — ENCOUNTER SYMPTOMS
MUSCULOSKELETAL NEGATIVE: 1
NECK NEGATIVE: 1
CARDIOVASCULAR NEGATIVE: 1
NECK SWELLING: 1
ENDOCRINE NEGATIVE: 1
NECK MASS: 1
TROUBLE SWALLOWING: 1
EYES NEGATIVE: 1
CONSTITUTIONAL NEGATIVE: 1
RESPIRATORY NEGATIVE: 1
GASTROINTESTINAL NEGATIVE: 1
NEUROLOGICAL NEGATIVE: 1

## 2023-11-14 ASSESSMENT — DUKE ACTIVITY SCORE INDEX (DASI)
DASI METS SCORE: 7.6
TOTAL_SCORE: 39.45
CAN YOU CLIMB A FLIGHT OF STAIRS OR WALK UP A HILL: YES
CAN YOU TAKE CARE OF YOURSELF (EAT, DRESS, BATHE, OR USE TOILET): YES
CAN YOU RUN A SHORT DISTANCE: NO
CAN YOU DO LIGHT WORK AROUND THE HOUSE LIKE DUSTING OR WASHING DISHES: YES
CAN YOU DO MODERATE WORK AROUND THE HOUSE LIKE VACUUMING, SWEEPING FLOORS OR CARRYING GROCERIES: YES
CAN YOU WALK INDOORS, SUCH AS AROUND YOUR HOUSE: YES
CAN YOU PARTICIPATE IN STRENOUS SPORTS LIKE SWIMMING, SINGLES TENNIS, FOOTBALL, BASKETBALL, OR SKIING: YES
CAN YOU DO YARD WORK LIKE RAKING LEAVES, WEEDING OR PUSHING A MOWER: YES
CAN YOU WALK A BLOCK OR TWO ON LEVEL GROUND: NO
CAN YOU DO HEAVY WORK AROUND THE HOUSE LIKE SCRUBBING FLOORS OR LIFTING AND MOVING HEAVY FURNITURE: NO
CAN YOU PARTICIPATE IN MODERATE RECREATIONAL ACTIVITIES LIKE GOLF, BOWLING, DANCING, DOUBLES TENNIS OR THROWING A BASEBALL OR FOOTBALL: YES
CAN YOU HAVE SEXUAL RELATIONS: YES

## 2023-11-14 ASSESSMENT — LIFESTYLE VARIABLES: SMOKING_STATUS: NONSMOKER

## 2023-11-14 NOTE — TELEPHONE ENCOUNTER
Sent LiveProfile message earlier today to patient, but she did not respond. I left her a message letting her know that Dr. Dalton had a last minute cancel for surgery on 11/21 and wanted to know if she would like to move to that date. I let her know there was no pressure, but she could call us back and let us know. I left our office number for her to call.

## 2023-11-14 NOTE — H&P (VIEW-ONLY)
CPM/PAT Evaluation       Name: Amita Ponce (Amita Ponce)  /Age: 1960/63 y.o.     Visit Type:   In-Person       Chief Complaint: Right cancer scheduled for surgery    HPI: Patient is a 63-year-old female scheduled for thyroidectomy on 2023 for treatment of thyroid cancer.  The patient is referred by Dr. Vandana Dalton for preoperative evaluation of anxiety, chronic lymphocytic leukemia not currently on any treatment, abnormal uterine bleeding status post remote hysterectomy, GERD, hiatal hernia, LINO compliant with CPAP, thyroid cancer, morbid obesity.    Past Medical History:   Diagnosis Date    Anxiety     CLL (chronic lymphocytic leukemia) (CMS/HCC)     Dysfunctional uterine bleeding     GERD (gastroesophageal reflux disease)     Hiatal hernia     Personal history of leukemia     History of chronic lymphocytic leukemia    Sleep apnea     uses CPAP    Thyroid cancer (CMS/HCC)     malignant papillary carcinoma    Unspecified visual loss     Vision loss       Past Surgical History:   Procedure Laterality Date    APPENDECTOMY      CHOLECYSTECTOMY      HEMORRHOID SURGERY  2018    Hemorrhoidectomy    HYSTERECTOMY  2018    US GUIDED THYROID BIOPSY  10/20/2023    US GUIDED THYROID BIOPSY 10/20/2023 Tara Peralta MD [facility]       Patient  has no history on file for sexual activity.    Family History   Problem Relation Name Age of Onset    Lung cancer Mother      Colon cancer Mother      Colon cancer Maternal Grandmother         Allergies   Allergen Reactions    Hydrocodone-Acetaminophen Agitation     Patient stated she get hot flashed,feverish.       Prior to Admission medications    Medication Sig Start Date End Date Taking? Authorizing Provider   cetirizine (ZyrTEC) 10 mg tablet Take 1 tablet (10 mg) by mouth twice a day. 11/15/16   Historical Provider, MD   cholecalciferol (Vitamin D-3) 25 MCG (1000 UT) tablet Take 3 tablets (3,000 Units) by mouth once daily.    Historical Provider, MD    citalopram (CeleXA) 20 mg tablet Take 1 tablet (20 mg) by mouth once daily. 9/25/23   Historical Provider, MD   montelukast (Singulair) 10 mg tablet Take 1 tablet (10 mg) by mouth once daily. 9/25/23   Historical Provider, MD   nystatin, bulk, 15 billion unit powder USE AS DIRECTED. Apply to perianal area twice a day 4/19/18   Historical Provider, MD   traMADol (Ultram) 50 mg tablet Take 1 tablet (50 mg) by mouth every 6 hours if needed for severe pain (7 - 10) for up to 14 days. 11/3/23 11/17/23  Vandana Dalton MD   VITAMIN B COMPLEX ORAL Take 1 tablet by mouth once daily. 4/4/18   Historical Provider, MD   ergocalciferol (Vitamin D-2) 1.25 MG (32414 UT) capsule Take 1 capsule (1,250 mcg) by mouth 1 (one) time per week. 4/4/18 11/14/23  Historical Provider, MD CARLOS ROS:   Constitutional:   neg    Neuro/Psych:   neg    Eyes:   neg     use of corrective lenses  Ears:   Nose:   neg    Mouth:   neg    Throat:    Dysphagia related to thyroid   dysphagia  Neck:    Right sided neck mass  neg     neck swelling   neck masses  Cardio:   neg    Respiratory:   neg    Endocrine:   neg    GI:   neg    :   neg    Musculoskeletal:   neg    Hematologic:   neg    Skin:  neg        Physical Exam  Vitals reviewed.   Constitutional:       Appearance: Normal appearance.   HENT:      Head: Normocephalic.      Mouth/Throat:      Mouth: Mucous membranes are dry.   Eyes:      Conjunctiva/sclera: Conjunctivae normal.   Neck:      Thyroid: Thyroid mass present.      Vascular: No carotid bruit.        Comments: Right sided neck mass  Cardiovascular:      Rate and Rhythm: Normal rate and regular rhythm.      Pulses: Normal pulses.      Heart sounds: Normal heart sounds.   Pulmonary:      Effort: Pulmonary effort is normal.      Breath sounds: Normal breath sounds.   Abdominal:      Palpations: Abdomen is soft.      Tenderness: There is no abdominal tenderness.   Musculoskeletal:         General: Normal range of motion.      Cervical  back: Normal range of motion.      Right lower leg: No edema.      Left lower leg: No edema.   Lymphadenopathy:      Cervical: No cervical adenopathy.   Skin:     General: Skin is warm and dry.      Capillary Refill: Capillary refill takes less than 2 seconds.   Neurological:      General: No focal deficit present.      Mental Status: She is alert and oriented to person, place, and time.   Psychiatric:         Mood and Affect: Mood normal.         Behavior: Behavior normal.         Thought Content: Thought content normal.         Judgment: Judgment normal.          PAT AIRWAY:   Airway:     Mallampati::  II    Neck ROM::  Full   Poor dentition         Visit Vitals  /72   Pulse 65   Temp 36.7 °C (98 °F)       DASI Risk Score      Flowsheet Row Most Recent Value   DASI SCORE 39.45   METS Score (Will be calculated only when all the questions are answered) 7.6          Caprini DVT Assessment      Flowsheet Row Most Recent Value   DVT Score 13   Current Status Major surgery planned, lasting over 3 hours, Present cancer or chemotherapy   History Prior major surgery   Age 60-75 years   BMI 31-40 (Obesity)          Modified Frailty Index      Flowsheet Row Most Recent Value   Modified Frailty Index Calculator 0          CHADS2 Stroke Risk  Current as of 8 minutes ago        N/A 3 - 100%: High Risk   2 - 3%: Medium Risk   0 - 2%: Low Risk     Last Change: N/A          This score determines the patient's risk of having a stroke if the patient has atrial fibrillation.        This score is not applicable to this patient. Components are not calculated.          Revised Cardiac Risk Index      Flowsheet Row Most Recent Value   Revised Cardiac Risk Calculator 0          Apfel Simplified Score      Flowsheet Row Most Recent Value   Apfel Simplified Score Calculator 4          Risk Analysis Index Results This Encounter    No data found in the last 1 encounters.       Stop Bang Score      Flowsheet Row Most Recent Value   Do  you snore loudly? 1   Do you often feel tired or fatigued after your sleep? 1   Has anyone ever observed you stop breathing in your sleep? 1   Do you have or are you being treated for high blood pressure? 0   Recent BMI (Calculated) 40.7   Is BMI greater than 35 kg/m2? 1=Yes   Age older than 50 years old? 1=Yes   Is your neck circumference greater than 17 inches (Male) or 16 inches (Female)? 0   Gender - Male 0=No   STOP-BANG Total Score 5            Assessment and Plan:   Neuro:  anxiety managed with citalopram.  The patient is at an increased risk for perioperative stroke secondary to female sex and general anesthesia with op time >2.5 hours.  Preoperative brain exercise educational handout provided to patient.    HEENT/Airway:  thyroid cancer scheduled for surgery    Cardiovascular:  No diagnosis or significant findings on chart review or clinical presentation and evaluation.  No additional preoperative testing is currently indicated.    METS are 7.6    RCRI  0 which is 3.9% 30 day risk of MACE (risk for cardiac death, nonfatal myocardial infarction, and nonfactal cardiac arrest    MITRA score which indicates a 0.1% risk of intraoperative or 30-day postoperative MACE      Pulmonary:  LINO compliant with CPAP.  Preoperative deep breathing exercises educational handout provided to patient    ARISCAT:  26  points which is an intermediate risk of in-hospital post-op pulmonary complications     PRODIGY:  13  points which is a intermediate risk of post op opioid induced respiratory depression episodes    STOP BAN  points which is a high risk for moderate to severe LINO. Patient to bring CPAP for surgery.    Renal: No diagnosis or significant findings on chart review or clinical presentation and evaluation, however, the patient is at increased risk of perioperative renal complications secondary to age>56. Preventative measures include preoperative hydration.    Endocrine:  thyroid cancer scheduled for  surgery    Hematologic:   chronic lymphocytic leukemia diagnosed 10/2012. Disease stable not currently on any treatment. Follows with oncology Dr. Wilfrido Garcia last visit 12/01/2020 now seeing Dr. Howard last visit 10/03/23.    Caprini Score: 13 points which is a highest risk of perioperative VTE.  Preoperative DVT educational handout provided to patient.    Gastrointestinal:   GERD and hiatal hernia managed with OTC PRN pain relievers    EAT-10 score of 5- self-perceived oropharyngeal dysphagia scale (0-40) related to large thyroid mass.    Apfel:  4  points 79% risk for post operative N/V    Infectious disease:  No diagnosis or significant findings on chart review or clinical presentation and evaluation.      Musculoskeletal:  No diagnosis or significant findings on chart review or clinical presentation and evaluation.     Labs ordered  Results for orders placed or performed in visit on 11/14/23 (from the past 96 hour(s))   CBC   Result Value Ref Range    WBC 14.5 (H) 4.4 - 11.3 x10*3/uL    nRBC 0.0 0.0 - 0.0 /100 WBCs    RBC 4.54 4.00 - 5.20 x10*6/uL    Hemoglobin 13.0 12.0 - 16.0 g/dL    Hematocrit 41.1 36.0 - 46.0 %    MCV 91 80 - 100 fL    MCH 28.6 26.0 - 34.0 pg    MCHC 31.6 (L) 32.0 - 36.0 g/dL    RDW 13.9 11.5 - 14.5 %    Platelets 266 150 - 450 x10*3/uL   Basic Metabolic Panel   Result Value Ref Range    Glucose 76 74 - 99 mg/dL    Sodium 141 136 - 145 mmol/L    Potassium 4.7 3.5 - 5.3 mmol/L    Chloride 105 98 - 107 mmol/L    Bicarbonate 31 21 - 32 mmol/L    Anion Gap 10 10 - 20 mmol/L    Urea Nitrogen 15 6 - 23 mg/dL    Creatinine 0.63 0.50 - 1.05 mg/dL    eGFR >90 >60 mL/min/1.73m*2    Calcium 10.4 8.6 - 10.6 mg/dL   Type And Screen   Result Value Ref Range    ABO TYPE O     Rh TYPE POS     ANTIBODY SCREEN NEG

## 2023-11-14 NOTE — CPM/PAT H&P
CPM/PAT Evaluation       Name: Amita Ponce (Amita Ponce)  /Age: 1960/63 y.o.     Visit Type:   In-Person       Chief Complaint: Right cancer scheduled for surgery    HPI: Patient is a 63-year-old female scheduled for thyroidectomy on 2023 for treatment of thyroid cancer.  The patient is referred by Dr. Vandana Dalton for preoperative evaluation of anxiety, chronic lymphocytic leukemia not currently on any treatment, abnormal uterine bleeding status post remote hysterectomy, GERD, hiatal hernia, LINO compliant with CPAP, thyroid cancer, morbid obesity.    Past Medical History:   Diagnosis Date    Anxiety     CLL (chronic lymphocytic leukemia) (CMS/HCC)     Dysfunctional uterine bleeding     GERD (gastroesophageal reflux disease)     Hiatal hernia     Personal history of leukemia     History of chronic lymphocytic leukemia    Sleep apnea     uses CPAP    Thyroid cancer (CMS/HCC)     malignant papillary carcinoma    Unspecified visual loss     Vision loss       Past Surgical History:   Procedure Laterality Date    APPENDECTOMY      CHOLECYSTECTOMY      HEMORRHOID SURGERY  2018    Hemorrhoidectomy    HYSTERECTOMY  2018    US GUIDED THYROID BIOPSY  10/20/2023    US GUIDED THYROID BIOPSY 10/20/2023 Tara Peralta MD [facility]       Patient  has no history on file for sexual activity.    Family History   Problem Relation Name Age of Onset    Lung cancer Mother      Colon cancer Mother      Colon cancer Maternal Grandmother         Allergies   Allergen Reactions    Hydrocodone-Acetaminophen Agitation     Patient stated she get hot flashed,feverish.       Prior to Admission medications    Medication Sig Start Date End Date Taking? Authorizing Provider   cetirizine (ZyrTEC) 10 mg tablet Take 1 tablet (10 mg) by mouth twice a day. 11/15/16   Historical Provider, MD   cholecalciferol (Vitamin D-3) 25 MCG (1000 UT) tablet Take 3 tablets (3,000 Units) by mouth once daily.    Historical Provider, MD    citalopram (CeleXA) 20 mg tablet Take 1 tablet (20 mg) by mouth once daily. 9/25/23   Historical Provider, MD   montelukast (Singulair) 10 mg tablet Take 1 tablet (10 mg) by mouth once daily. 9/25/23   Historical Provider, MD   nystatin, bulk, 15 billion unit powder USE AS DIRECTED. Apply to perianal area twice a day 4/19/18   Historical Provider, MD   traMADol (Ultram) 50 mg tablet Take 1 tablet (50 mg) by mouth every 6 hours if needed for severe pain (7 - 10) for up to 14 days. 11/3/23 11/17/23  Vandana Dalton MD   VITAMIN B COMPLEX ORAL Take 1 tablet by mouth once daily. 4/4/18   Historical Provider, MD   ergocalciferol (Vitamin D-2) 1.25 MG (98902 UT) capsule Take 1 capsule (1,250 mcg) by mouth 1 (one) time per week. 4/4/18 11/14/23  Historical Provider, MD CARLOS ROS:   Constitutional:   neg    Neuro/Psych:   neg    Eyes:   neg     use of corrective lenses  Ears:   Nose:   neg    Mouth:   neg    Throat:    Dysphagia related to thyroid   dysphagia  Neck:    Right sided neck mass  neg     neck swelling   neck masses  Cardio:   neg    Respiratory:   neg    Endocrine:   neg    GI:   neg    :   neg    Musculoskeletal:   neg    Hematologic:   neg    Skin:  neg        Physical Exam  Vitals reviewed.   Constitutional:       Appearance: Normal appearance.   HENT:      Head: Normocephalic.      Mouth/Throat:      Mouth: Mucous membranes are dry.   Eyes:      Conjunctiva/sclera: Conjunctivae normal.   Neck:      Thyroid: Thyroid mass present.      Vascular: No carotid bruit.        Comments: Right sided neck mass  Cardiovascular:      Rate and Rhythm: Normal rate and regular rhythm.      Pulses: Normal pulses.      Heart sounds: Normal heart sounds.   Pulmonary:      Effort: Pulmonary effort is normal.      Breath sounds: Normal breath sounds.   Abdominal:      Palpations: Abdomen is soft.      Tenderness: There is no abdominal tenderness.   Musculoskeletal:         General: Normal range of motion.      Cervical  back: Normal range of motion.      Right lower leg: No edema.      Left lower leg: No edema.   Lymphadenopathy:      Cervical: No cervical adenopathy.   Skin:     General: Skin is warm and dry.      Capillary Refill: Capillary refill takes less than 2 seconds.   Neurological:      General: No focal deficit present.      Mental Status: She is alert and oriented to person, place, and time.   Psychiatric:         Mood and Affect: Mood normal.         Behavior: Behavior normal.         Thought Content: Thought content normal.         Judgment: Judgment normal.          PAT AIRWAY:   Airway:     Mallampati::  II    Neck ROM::  Full   Poor dentition         Visit Vitals  /72   Pulse 65   Temp 36.7 °C (98 °F)       DASI Risk Score      Flowsheet Row Most Recent Value   DASI SCORE 39.45   METS Score (Will be calculated only when all the questions are answered) 7.6          Caprini DVT Assessment      Flowsheet Row Most Recent Value   DVT Score 13   Current Status Major surgery planned, lasting over 3 hours, Present cancer or chemotherapy   History Prior major surgery   Age 60-75 years   BMI 31-40 (Obesity)          Modified Frailty Index      Flowsheet Row Most Recent Value   Modified Frailty Index Calculator 0          CHADS2 Stroke Risk  Current as of 8 minutes ago        N/A 3 - 100%: High Risk   2 - 3%: Medium Risk   0 - 2%: Low Risk     Last Change: N/A          This score determines the patient's risk of having a stroke if the patient has atrial fibrillation.        This score is not applicable to this patient. Components are not calculated.          Revised Cardiac Risk Index      Flowsheet Row Most Recent Value   Revised Cardiac Risk Calculator 0          Apfel Simplified Score      Flowsheet Row Most Recent Value   Apfel Simplified Score Calculator 4          Risk Analysis Index Results This Encounter    No data found in the last 1 encounters.       Stop Bang Score      Flowsheet Row Most Recent Value   Do  you snore loudly? 1   Do you often feel tired or fatigued after your sleep? 1   Has anyone ever observed you stop breathing in your sleep? 1   Do you have or are you being treated for high blood pressure? 0   Recent BMI (Calculated) 40.7   Is BMI greater than 35 kg/m2? 1=Yes   Age older than 50 years old? 1=Yes   Is your neck circumference greater than 17 inches (Male) or 16 inches (Female)? 0   Gender - Male 0=No   STOP-BANG Total Score 5            Assessment and Plan:   Neuro:  anxiety managed with citalopram.  The patient is at an increased risk for perioperative stroke secondary to female sex and general anesthesia with op time >2.5 hours.  Preoperative brain exercise educational handout provided to patient.    HEENT/Airway:  thyroid cancer scheduled for surgery    Cardiovascular:  No diagnosis or significant findings on chart review or clinical presentation and evaluation.  No additional preoperative testing is currently indicated.    METS are 7.6    RCRI  0 which is 3.9% 30 day risk of MACE (risk for cardiac death, nonfatal myocardial infarction, and nonfactal cardiac arrest    MITRA score which indicates a 0.1% risk of intraoperative or 30-day postoperative MACE      Pulmonary:  LINO compliant with CPAP.  Preoperative deep breathing exercises educational handout provided to patient    ARISCAT:  26  points which is an intermediate risk of in-hospital post-op pulmonary complications     PRODIGY:  13  points which is a intermediate risk of post op opioid induced respiratory depression episodes    STOP BAN  points which is a high risk for moderate to severe LINO. Patient to bring CPAP for surgery.    Renal: No diagnosis or significant findings on chart review or clinical presentation and evaluation, however, the patient is at increased risk of perioperative renal complications secondary to age>56. Preventative measures include preoperative hydration.    Endocrine:  thyroid cancer scheduled for  surgery    Hematologic:   chronic lymphocytic leukemia diagnosed 10/2012. Disease stable not currently on any treatment. Follows with oncology Dr. Wilfrido Garcia last visit 12/01/2020 now seeing Dr. Howard last visit 10/03/23.    Caprini Score: 13 points which is a highest risk of perioperative VTE.  Preoperative DVT educational handout provided to patient.    Gastrointestinal:   GERD and hiatal hernia managed with OTC PRN pain relievers    EAT-10 score of 5- self-perceived oropharyngeal dysphagia scale (0-40) related to large thyroid mass.    Apfel:  4  points 79% risk for post operative N/V    Infectious disease:  No diagnosis or significant findings on chart review or clinical presentation and evaluation.      Musculoskeletal:  No diagnosis or significant findings on chart review or clinical presentation and evaluation.     Labs ordered  Results for orders placed or performed in visit on 11/14/23 (from the past 96 hour(s))   CBC   Result Value Ref Range    WBC 14.5 (H) 4.4 - 11.3 x10*3/uL    nRBC 0.0 0.0 - 0.0 /100 WBCs    RBC 4.54 4.00 - 5.20 x10*6/uL    Hemoglobin 13.0 12.0 - 16.0 g/dL    Hematocrit 41.1 36.0 - 46.0 %    MCV 91 80 - 100 fL    MCH 28.6 26.0 - 34.0 pg    MCHC 31.6 (L) 32.0 - 36.0 g/dL    RDW 13.9 11.5 - 14.5 %    Platelets 266 150 - 450 x10*3/uL   Basic Metabolic Panel   Result Value Ref Range    Glucose 76 74 - 99 mg/dL    Sodium 141 136 - 145 mmol/L    Potassium 4.7 3.5 - 5.3 mmol/L    Chloride 105 98 - 107 mmol/L    Bicarbonate 31 21 - 32 mmol/L    Anion Gap 10 10 - 20 mmol/L    Urea Nitrogen 15 6 - 23 mg/dL    Creatinine 0.63 0.50 - 1.05 mg/dL    eGFR >90 >60 mL/min/1.73m*2    Calcium 10.4 8.6 - 10.6 mg/dL   Type And Screen   Result Value Ref Range    ABO TYPE O     Rh TYPE POS     ANTIBODY SCREEN NEG

## 2023-11-14 NOTE — PREPROCEDURE INSTRUCTIONS
NPO Instructions:    Do not eat any food after midnight the night before your surgery/procedure.  You may have 10 ounces of clear liquids until TWO hours before surgery/procedure. This includes water, black tea/coffee, (no milk or cream) apple juice and electrolyte drinks (Gatorade).  You may chew gum up to TWO hours before your surgery/procedure.    Additional Instructions:     The Day before Surgery:  Review your medication instructions, stop indicated medications  You will be contacted in the evening regarding the time of your arrival to facility and surgery time    Day of Surgery:  Review your medication instructions, take indicated medications  Wear  comfortable loose fitting clothing  Do not use moisturizers, creams, lotions or perfume  All jewelry and valuables should be left at home    Samantha Meeson, MSN, NP-C  Adult-Gerontology Nurse Practitioner II  Department of Anesthesiology and Perioperative Medicine  Main phone [phone]  Direct phone [phone]  Fax [phone]

## 2023-11-21 ENCOUNTER — ANESTHESIA EVENT (OUTPATIENT)
Dept: OPERATING ROOM | Facility: HOSPITAL | Age: 63
DRG: 626 | End: 2023-11-21
Payer: COMMERCIAL

## 2023-11-22 ENCOUNTER — ANESTHESIA (OUTPATIENT)
Dept: OPERATING ROOM | Facility: HOSPITAL | Age: 63
DRG: 626 | End: 2023-11-22
Payer: COMMERCIAL

## 2023-11-22 ENCOUNTER — HOSPITAL ENCOUNTER (INPATIENT)
Facility: HOSPITAL | Age: 63
LOS: 3 days | Discharge: HOME | DRG: 626 | End: 2023-11-25
Attending: OTOLARYNGOLOGY | Admitting: OTOLARYNGOLOGY
Payer: COMMERCIAL

## 2023-11-22 DIAGNOSIS — C73 PAPILLARY THYROID CARCINOMA (MULTI): Primary | ICD-10-CM

## 2023-11-22 LAB
25(OH)D3 SERPL-MCNC: 63 NG/ML (ref 30–100)
ALBUMIN SERPL BCP-MCNC: 4.2 G/DL (ref 3.4–5)
ALBUMIN SERPL BCP-MCNC: 4.2 G/DL (ref 3.4–5)
ANION GAP SERPL CALC-SCNC: 14 MMOL/L (ref 10–20)
ANION GAP SERPL CALC-SCNC: 14 MMOL/L (ref 10–20)
BUN SERPL-MCNC: 13 MG/DL (ref 6–23)
BUN SERPL-MCNC: 14 MG/DL (ref 6–23)
CALCIUM SERPL-MCNC: 9.1 MG/DL (ref 8.6–10.6)
CALCIUM SERPL-MCNC: 9.6 MG/DL (ref 8.6–10.6)
CHLORIDE SERPL-SCNC: 106 MMOL/L (ref 98–107)
CHLORIDE SERPL-SCNC: 107 MMOL/L (ref 98–107)
CO2 SERPL-SCNC: 26 MMOL/L (ref 21–32)
CO2 SERPL-SCNC: 26 MMOL/L (ref 21–32)
CREAT SERPL-MCNC: 0.74 MG/DL (ref 0.5–1.05)
CREAT SERPL-MCNC: 0.76 MG/DL (ref 0.5–1.05)
GFR SERPL CREATININE-BSD FRML MDRD: 89 ML/MIN/1.73M*2
GFR SERPL CREATININE-BSD FRML MDRD: >90 ML/MIN/1.73M*2
GLUCOSE SERPL-MCNC: 147 MG/DL (ref 74–99)
GLUCOSE SERPL-MCNC: 169 MG/DL (ref 74–99)
MAGNESIUM SERPL-MCNC: 2.01 MG/DL (ref 1.6–2.4)
MAGNESIUM SERPL-MCNC: 2.12 MG/DL (ref 1.6–2.4)
PHOSPHATE SERPL-MCNC: 3.7 MG/DL (ref 2.5–4.9)
PHOSPHATE SERPL-MCNC: 4.2 MG/DL (ref 2.5–4.9)
POTASSIUM SERPL-SCNC: 4.2 MMOL/L (ref 3.5–5.3)
POTASSIUM SERPL-SCNC: 4.4 MMOL/L (ref 3.5–5.3)
PTH-INTACT SERPL-MCNC: 50.6 PG/ML (ref 18.5–88)
SODIUM SERPL-SCNC: 142 MMOL/L (ref 136–145)
SODIUM SERPL-SCNC: 143 MMOL/L (ref 136–145)
TSH SERPL-ACNC: 0.73 MIU/L (ref 0.44–3.98)

## 2023-11-22 PROCEDURE — A60240 PR THYROIDECTOMY: Performed by: ANESTHESIOLOGY

## 2023-11-22 PROCEDURE — 2500000004 HC RX 250 GENERAL PHARMACY W/ HCPCS (ALT 636 FOR OP/ED): Performed by: STUDENT IN AN ORGANIZED HEALTH CARE EDUCATION/TRAINING PROGRAM

## 2023-11-22 PROCEDURE — 3600000004 HC OR TIME - INITIAL BASE CHARGE - PROCEDURE LEVEL FOUR: Performed by: OTOLARYNGOLOGY

## 2023-11-22 PROCEDURE — A60240 PR THYROIDECTOMY: Performed by: ANESTHESIOLOGIST ASSISTANT

## 2023-11-22 PROCEDURE — 38720 REMOVAL OF LYMPH NODES NECK: CPT | Performed by: OTOLARYNGOLOGY

## 2023-11-22 PROCEDURE — 7100000002 HC RECOVERY ROOM TIME - EACH INCREMENTAL 1 MINUTE: Performed by: OTOLARYNGOLOGY

## 2023-11-22 PROCEDURE — 82306 VITAMIN D 25 HYDROXY: CPT | Performed by: STUDENT IN AN ORGANIZED HEALTH CARE EDUCATION/TRAINING PROGRAM

## 2023-11-22 PROCEDURE — 2500000004 HC RX 250 GENERAL PHARMACY W/ HCPCS (ALT 636 FOR OP/ED): Performed by: OTOLARYNGOLOGY

## 2023-11-22 PROCEDURE — 2500000005 HC RX 250 GENERAL PHARMACY W/O HCPCS: Performed by: ANESTHESIOLOGIST ASSISTANT

## 2023-11-22 PROCEDURE — 83735 ASSAY OF MAGNESIUM: CPT | Performed by: STUDENT IN AN ORGANIZED HEALTH CARE EDUCATION/TRAINING PROGRAM

## 2023-11-22 PROCEDURE — 3600000009 HC OR TIME - EACH INCREMENTAL 1 MINUTE - PROCEDURE LEVEL FOUR: Performed by: OTOLARYNGOLOGY

## 2023-11-22 PROCEDURE — 80069 RENAL FUNCTION PANEL: CPT | Performed by: STUDENT IN AN ORGANIZED HEALTH CARE EDUCATION/TRAINING PROGRAM

## 2023-11-22 PROCEDURE — 1200000002 HC GENERAL ROOM WITH TELEMETRY DAILY

## 2023-11-22 PROCEDURE — 2500000001 HC RX 250 WO HCPCS SELF ADMINISTERED DRUGS (ALT 637 FOR MEDICARE OP): Performed by: STUDENT IN AN ORGANIZED HEALTH CARE EDUCATION/TRAINING PROGRAM

## 2023-11-22 PROCEDURE — 86800 THYROGLOBULIN ANTIBODY: CPT | Performed by: STUDENT IN AN ORGANIZED HEALTH CARE EDUCATION/TRAINING PROGRAM

## 2023-11-22 PROCEDURE — 2500000001 HC RX 250 WO HCPCS SELF ADMINISTERED DRUGS (ALT 637 FOR MEDICARE OP): Performed by: OTOLARYNGOLOGY

## 2023-11-22 PROCEDURE — 2780000003 HC OR 278 NO HCPCS: Performed by: OTOLARYNGOLOGY

## 2023-11-22 PROCEDURE — 36415 COLL VENOUS BLD VENIPUNCTURE: CPT | Performed by: STUDENT IN AN ORGANIZED HEALTH CARE EDUCATION/TRAINING PROGRAM

## 2023-11-22 PROCEDURE — A4217 STERILE WATER/SALINE, 500 ML: HCPCS | Performed by: OTOLARYNGOLOGY

## 2023-11-22 PROCEDURE — 2500000004 HC RX 250 GENERAL PHARMACY W/ HCPCS (ALT 636 FOR OP/ED): Performed by: ANESTHESIOLOGY

## 2023-11-22 PROCEDURE — 84432 ASSAY OF THYROGLOBULIN: CPT | Performed by: STUDENT IN AN ORGANIZED HEALTH CARE EDUCATION/TRAINING PROGRAM

## 2023-11-22 PROCEDURE — 0GTH0ZZ RESECTION OF RIGHT THYROID GLAND LOBE, OPEN APPROACH: ICD-10-PCS | Performed by: OTOLARYNGOLOGY

## 2023-11-22 PROCEDURE — 88307 TISSUE EXAM BY PATHOLOGIST: CPT | Performed by: PATHOLOGY

## 2023-11-22 PROCEDURE — 60512 AUTOTRANSPLANT PARATHYROID: CPT | Performed by: OTOLARYNGOLOGY

## 2023-11-22 PROCEDURE — 2500000005 HC RX 250 GENERAL PHARMACY W/O HCPCS: Performed by: ANESTHESIOLOGY

## 2023-11-22 PROCEDURE — 3700000001 HC GENERAL ANESTHESIA TIME - INITIAL BASE CHARGE: Performed by: OTOLARYNGOLOGY

## 2023-11-22 PROCEDURE — 2500000005 HC RX 250 GENERAL PHARMACY W/O HCPCS: Performed by: NURSE ANESTHETIST, CERTIFIED REGISTERED

## 2023-11-22 PROCEDURE — 96372 THER/PROPH/DIAG INJ SC/IM: CPT | Performed by: STUDENT IN AN ORGANIZED HEALTH CARE EDUCATION/TRAINING PROGRAM

## 2023-11-22 PROCEDURE — 60252 REMOVAL OF THYROID: CPT | Performed by: OTOLARYNGOLOGY

## 2023-11-22 PROCEDURE — 84443 ASSAY THYROID STIM HORMONE: CPT | Performed by: STUDENT IN AN ORGANIZED HEALTH CARE EDUCATION/TRAINING PROGRAM

## 2023-11-22 PROCEDURE — 7100000001 HC RECOVERY ROOM TIME - INITIAL BASE CHARGE: Performed by: OTOLARYNGOLOGY

## 2023-11-22 PROCEDURE — 2500000004 HC RX 250 GENERAL PHARMACY W/ HCPCS (ALT 636 FOR OP/ED): Performed by: NURSE ANESTHETIST, CERTIFIED REGISTERED

## 2023-11-22 PROCEDURE — 88305 TISSUE EXAM BY PATHOLOGIST: CPT | Performed by: PATHOLOGY

## 2023-11-22 PROCEDURE — 07B10ZZ EXCISION OF RIGHT NECK LYMPHATIC, OPEN APPROACH: ICD-10-PCS | Performed by: OTOLARYNGOLOGY

## 2023-11-22 PROCEDURE — 88331 PATH CONSLTJ SURG 1 BLK 1SPC: CPT | Performed by: PATHOLOGY

## 2023-11-22 PROCEDURE — 3700000002 HC GENERAL ANESTHESIA TIME - EACH INCREMENTAL 1 MINUTE: Performed by: OTOLARYNGOLOGY

## 2023-11-22 PROCEDURE — 2500000004 HC RX 250 GENERAL PHARMACY W/ HCPCS (ALT 636 FOR OP/ED): Performed by: ANESTHESIOLOGIST ASSISTANT

## 2023-11-22 PROCEDURE — 83970 ASSAY OF PARATHORMONE: CPT | Performed by: STUDENT IN AN ORGANIZED HEALTH CARE EDUCATION/TRAINING PROGRAM

## 2023-11-22 PROCEDURE — 88341 IMHCHEM/IMCYTCHM EA ADD ANTB: CPT | Mod: TC,SUR | Performed by: OTOLARYNGOLOGY

## 2023-11-22 RX ORDER — METOPROLOL TARTRATE 1 MG/ML
INJECTION, SOLUTION INTRAVENOUS AS NEEDED
Status: DISCONTINUED | OUTPATIENT
Start: 2023-11-22 | End: 2023-11-22

## 2023-11-22 RX ORDER — HYDROMORPHONE HCL/0.9% NACL/PF 15 MG/30ML
PATIENT CONTROLLED ANALGESIA SYRINGE INTRAVENOUS CONTINUOUS
Status: DISCONTINUED | OUTPATIENT
Start: 2023-11-22 | End: 2023-11-24

## 2023-11-22 RX ORDER — ACETAMINOPHEN 160 MG/5ML
1000 SOLUTION ORAL EVERY 8 HOURS SCHEDULED
Status: DISCONTINUED | OUTPATIENT
Start: 2023-11-22 | End: 2023-11-25 | Stop reason: HOSPADM

## 2023-11-22 RX ORDER — PANTOPRAZOLE SODIUM 40 MG/10ML
40 INJECTION, POWDER, LYOPHILIZED, FOR SOLUTION INTRAVENOUS DAILY
Status: DISCONTINUED | OUTPATIENT
Start: 2023-11-23 | End: 2023-11-25 | Stop reason: HOSPADM

## 2023-11-22 RX ORDER — CALCIUM CARBONATE 200(500)MG
500 TABLET,CHEWABLE ORAL 2 TIMES DAILY PRN
Status: DISCONTINUED | OUTPATIENT
Start: 2023-11-22 | End: 2023-11-22

## 2023-11-22 RX ORDER — CALCIUM GLUCONATE 20 MG/ML
1 INJECTION, SOLUTION INTRAVENOUS EVERY 4 HOURS PRN
Status: DISCONTINUED | OUTPATIENT
Start: 2023-11-22 | End: 2023-11-22

## 2023-11-22 RX ORDER — HYDROMORPHONE HYDROCHLORIDE 1 MG/ML
0.2 INJECTION, SOLUTION INTRAMUSCULAR; INTRAVENOUS; SUBCUTANEOUS EVERY 5 MIN PRN
Status: DISCONTINUED | OUTPATIENT
Start: 2023-11-22 | End: 2023-11-22 | Stop reason: HOSPADM

## 2023-11-22 RX ORDER — LIDOCAINE HYDROCHLORIDE 10 MG/ML
0.1 INJECTION INFILTRATION; PERINEURAL ONCE
Status: DISCONTINUED | OUTPATIENT
Start: 2023-11-22 | End: 2023-11-22 | Stop reason: HOSPADM

## 2023-11-22 RX ORDER — SODIUM CHLORIDE, SODIUM LACTATE, POTASSIUM CHLORIDE, CALCIUM CHLORIDE 600; 310; 30; 20 MG/100ML; MG/100ML; MG/100ML; MG/100ML
100 INJECTION, SOLUTION INTRAVENOUS CONTINUOUS
Status: DISCONTINUED | OUTPATIENT
Start: 2023-11-22 | End: 2023-11-22 | Stop reason: HOSPADM

## 2023-11-22 RX ORDER — BACITRACIN 500 [USP'U]/G
OINTMENT TOPICAL AS NEEDED
Status: DISCONTINUED | OUTPATIENT
Start: 2023-11-22 | End: 2023-11-22 | Stop reason: HOSPADM

## 2023-11-22 RX ORDER — NALOXONE HYDROCHLORIDE 0.4 MG/ML
0.2 INJECTION, SOLUTION INTRAMUSCULAR; INTRAVENOUS; SUBCUTANEOUS EVERY 5 MIN PRN
Status: DISCONTINUED | OUTPATIENT
Start: 2023-11-22 | End: 2023-11-25 | Stop reason: HOSPADM

## 2023-11-22 RX ORDER — NALOXONE HYDROCHLORIDE 0.4 MG/ML
0.2 INJECTION, SOLUTION INTRAMUSCULAR; INTRAVENOUS; SUBCUTANEOUS AS NEEDED
Status: DISCONTINUED | OUTPATIENT
Start: 2023-11-22 | End: 2023-11-25 | Stop reason: HOSPADM

## 2023-11-22 RX ORDER — CEFAZOLIN SODIUM 2 G/100ML
2 INJECTION, SOLUTION INTRAVENOUS EVERY 8 HOURS
Status: DISCONTINUED | OUTPATIENT
Start: 2023-11-22 | End: 2023-11-22 | Stop reason: HOSPADM

## 2023-11-22 RX ORDER — CALCIUM GLUCONATE 20 MG/ML
1 INJECTION, SOLUTION INTRAVENOUS DAILY PRN
Status: DISCONTINUED | OUTPATIENT
Start: 2023-11-22 | End: 2023-11-25 | Stop reason: HOSPADM

## 2023-11-22 RX ORDER — ROCURONIUM BROMIDE 10 MG/ML
INJECTION, SOLUTION INTRAVENOUS AS NEEDED
Status: DISCONTINUED | OUTPATIENT
Start: 2023-11-22 | End: 2023-11-22

## 2023-11-22 RX ORDER — ONDANSETRON HYDROCHLORIDE 2 MG/ML
4 INJECTION, SOLUTION INTRAVENOUS ONCE AS NEEDED
Status: COMPLETED | OUTPATIENT
Start: 2023-11-22 | End: 2023-11-22

## 2023-11-22 RX ORDER — CITALOPRAM 20 MG/1
20 TABLET, FILM COATED ORAL DAILY
Status: DISCONTINUED | OUTPATIENT
Start: 2023-11-23 | End: 2023-11-25 | Stop reason: HOSPADM

## 2023-11-22 RX ORDER — CEFAZOLIN 1 G/1
INJECTION, POWDER, FOR SOLUTION INTRAVENOUS AS NEEDED
Status: DISCONTINUED | OUTPATIENT
Start: 2023-11-22 | End: 2023-11-22

## 2023-11-22 RX ORDER — DEXAMETHASONE SODIUM PHOSPHATE 100 MG/10ML
INJECTION INTRAMUSCULAR; INTRAVENOUS AS NEEDED
Status: DISCONTINUED | OUTPATIENT
Start: 2023-11-22 | End: 2023-11-22

## 2023-11-22 RX ORDER — ONDANSETRON HYDROCHLORIDE 2 MG/ML
4 INJECTION, SOLUTION INTRAVENOUS EVERY 8 HOURS PRN
Status: DISCONTINUED | OUTPATIENT
Start: 2023-11-22 | End: 2023-11-25 | Stop reason: HOSPADM

## 2023-11-22 RX ORDER — LIDOCAINE HCL/PF 100 MG/5ML
SYRINGE (ML) INTRAVENOUS AS NEEDED
Status: DISCONTINUED | OUTPATIENT
Start: 2023-11-22 | End: 2023-11-22

## 2023-11-22 RX ORDER — PETROLATUM 420 MG/G
1 OINTMENT TOPICAL 3 TIMES DAILY
Status: DISCONTINUED | OUTPATIENT
Start: 2023-11-24 | End: 2023-11-25 | Stop reason: HOSPADM

## 2023-11-22 RX ORDER — HYDROMORPHONE HYDROCHLORIDE 1 MG/ML
INJECTION, SOLUTION INTRAMUSCULAR; INTRAVENOUS; SUBCUTANEOUS AS NEEDED
Status: DISCONTINUED | OUTPATIENT
Start: 2023-11-22 | End: 2023-11-22

## 2023-11-22 RX ORDER — SODIUM CHLORIDE, SODIUM LACTATE, POTASSIUM CHLORIDE, CALCIUM CHLORIDE 600; 310; 30; 20 MG/100ML; MG/100ML; MG/100ML; MG/100ML
75 INJECTION, SOLUTION INTRAVENOUS CONTINUOUS
Status: DISCONTINUED | OUTPATIENT
Start: 2023-11-22 | End: 2023-11-24

## 2023-11-22 RX ORDER — OXYCODONE HYDROCHLORIDE 5 MG/1
10 TABLET ORAL EVERY 4 HOURS PRN
Status: DISCONTINUED | OUTPATIENT
Start: 2023-11-22 | End: 2023-11-22 | Stop reason: HOSPADM

## 2023-11-22 RX ORDER — POLYETHYLENE GLYCOL 3350 17 G/17G
17 POWDER, FOR SOLUTION ORAL DAILY
Status: DISCONTINUED | OUTPATIENT
Start: 2023-11-23 | End: 2023-11-25 | Stop reason: HOSPADM

## 2023-11-22 RX ORDER — HEPARIN SODIUM 5000 [USP'U]/ML
5000 INJECTION, SOLUTION INTRAVENOUS; SUBCUTANEOUS EVERY 8 HOURS
Status: DISCONTINUED | OUTPATIENT
Start: 2023-11-22 | End: 2023-11-25 | Stop reason: HOSPADM

## 2023-11-22 RX ORDER — CALCIUM CARBONATE 500(1250)
1250 TABLET,CHEWABLE ORAL 2 TIMES DAILY PRN
Status: DISCONTINUED | OUTPATIENT
Start: 2023-11-22 | End: 2023-11-22

## 2023-11-22 RX ORDER — SODIUM CHLORIDE 0.9 G/100ML
IRRIGANT IRRIGATION AS NEEDED
Status: DISCONTINUED | OUTPATIENT
Start: 2023-11-22 | End: 2023-11-22 | Stop reason: HOSPADM

## 2023-11-22 RX ORDER — BACITRACIN ZINC 500 UNIT/G
OINTMENT IN PACKET (EA) TOPICAL 3 TIMES DAILY
Status: COMPLETED | OUTPATIENT
Start: 2023-11-22 | End: 2023-11-24

## 2023-11-22 RX ORDER — ONDANSETRON HYDROCHLORIDE 2 MG/ML
INJECTION, SOLUTION INTRAVENOUS AS NEEDED
Status: DISCONTINUED | OUTPATIENT
Start: 2023-11-22 | End: 2023-11-22

## 2023-11-22 RX ORDER — FENTANYL CITRATE 50 UG/ML
INJECTION, SOLUTION INTRAMUSCULAR; INTRAVENOUS AS NEEDED
Status: DISCONTINUED | OUTPATIENT
Start: 2023-11-22 | End: 2023-11-22

## 2023-11-22 RX ORDER — SCOLOPAMINE TRANSDERMAL SYSTEM 1 MG/1
PATCH, EXTENDED RELEASE TRANSDERMAL AS NEEDED
Status: DISCONTINUED | OUTPATIENT
Start: 2023-11-22 | End: 2023-11-22

## 2023-11-22 RX ORDER — CALCIUM CARBONATE 200(500)MG
1500 TABLET,CHEWABLE ORAL 2 TIMES DAILY PRN
Status: DISCONTINUED | OUTPATIENT
Start: 2023-11-22 | End: 2023-11-25 | Stop reason: HOSPADM

## 2023-11-22 RX ORDER — HYDROMORPHONE HYDROCHLORIDE 1 MG/ML
0.5 INJECTION, SOLUTION INTRAMUSCULAR; INTRAVENOUS; SUBCUTANEOUS EVERY 5 MIN PRN
Status: DISCONTINUED | OUTPATIENT
Start: 2023-11-22 | End: 2023-11-22 | Stop reason: HOSPADM

## 2023-11-22 RX ORDER — HYDROGEN PEROXIDE 3 %
1 SOLUTION, NON-ORAL MISCELLANEOUS 3 TIMES DAILY
Status: DISCONTINUED | OUTPATIENT
Start: 2023-11-22 | End: 2023-11-25 | Stop reason: HOSPADM

## 2023-11-22 RX ORDER — CALCIUM CARBONATE 200(500)MG
1250 TABLET,CHEWABLE ORAL 2 TIMES DAILY PRN
Status: DISCONTINUED | OUTPATIENT
Start: 2023-11-22 | End: 2023-11-22

## 2023-11-22 RX ORDER — PHENYLEPHRINE HCL IN 0.9% NACL 1 MG/10 ML
SYRINGE (ML) INTRAVENOUS AS NEEDED
Status: DISCONTINUED | OUTPATIENT
Start: 2023-11-22 | End: 2023-11-22

## 2023-11-22 RX ORDER — CHOLECALCIFEROL (VITAMIN D3) 25 MCG
3000 TABLET ORAL
Status: DISCONTINUED | OUTPATIENT
Start: 2023-11-23 | End: 2023-11-25 | Stop reason: HOSPADM

## 2023-11-22 RX ORDER — ONDANSETRON 4 MG/1
4 TABLET, FILM COATED ORAL EVERY 8 HOURS PRN
Status: DISCONTINUED | OUTPATIENT
Start: 2023-11-22 | End: 2023-11-25 | Stop reason: HOSPADM

## 2023-11-22 RX ORDER — ACETAMINOPHEN 325 MG/1
975 TABLET ORAL EVERY 8 HOURS SCHEDULED
Status: DISCONTINUED | OUTPATIENT
Start: 2023-11-22 | End: 2023-11-25 | Stop reason: HOSPADM

## 2023-11-22 RX ORDER — PROPOFOL 10 MG/ML
INJECTION, EMULSION INTRAVENOUS AS NEEDED
Status: DISCONTINUED | OUTPATIENT
Start: 2023-11-22 | End: 2023-11-22

## 2023-11-22 RX ORDER — SUCCINYLCHOLINE CHLORIDE 20 MG/ML
INJECTION INTRAMUSCULAR; INTRAVENOUS AS NEEDED
Status: DISCONTINUED | OUTPATIENT
Start: 2023-11-22 | End: 2023-11-22

## 2023-11-22 RX ORDER — CALCIUM CARBONATE 200(500)MG
1500 TABLET,CHEWABLE ORAL 3 TIMES DAILY PRN
Status: DISCONTINUED | OUTPATIENT
Start: 2023-11-22 | End: 2023-11-25 | Stop reason: HOSPADM

## 2023-11-22 RX ORDER — ACETAMINOPHEN 325 MG/1
650 TABLET ORAL EVERY 4 HOURS PRN
Status: DISCONTINUED | OUTPATIENT
Start: 2023-11-22 | End: 2023-11-22 | Stop reason: HOSPADM

## 2023-11-22 RX ORDER — MIDAZOLAM HYDROCHLORIDE 1 MG/ML
INJECTION INTRAMUSCULAR; INTRAVENOUS AS NEEDED
Status: DISCONTINUED | OUTPATIENT
Start: 2023-11-22 | End: 2023-11-22

## 2023-11-22 RX ADMIN — HYDROMORPHONE HYDROCHLORIDE 0.5 MG: 1 INJECTION, SOLUTION INTRAMUSCULAR; INTRAVENOUS; SUBCUTANEOUS at 13:53

## 2023-11-22 RX ADMIN — SCOPOLAMINE 1 PATCH: 1.5 PATCH, EXTENDED RELEASE TRANSDERMAL at 18:26

## 2023-11-22 RX ADMIN — ROCURONIUM BROMIDE 20 MG: 50 INJECTION, SOLUTION INTRAVENOUS at 11:20

## 2023-11-22 RX ADMIN — HYDROGEN PEROXIDE 1 APPLICATION: 30 LIQUID TOPICAL at 21:00

## 2023-11-22 RX ADMIN — SODIUM CHLORIDE, SODIUM LACTATE, POTASSIUM CHLORIDE, AND CALCIUM CHLORIDE: 600; 310; 30; 20 INJECTION, SOLUTION INTRAVENOUS at 11:00

## 2023-11-22 RX ADMIN — SUCCINYLCHOLINE CHLORIDE 200 MG: 20 INJECTION, SOLUTION INTRAMUSCULAR; INTRAVENOUS at 11:08

## 2023-11-22 RX ADMIN — PROPOFOL 150 MG: 10 INJECTION, EMULSION INTRAVENOUS at 11:07

## 2023-11-22 RX ADMIN — FENTANYL CITRATE 25 MCG: 50 INJECTION, SOLUTION INTRAMUSCULAR; INTRAVENOUS at 11:54

## 2023-11-22 RX ADMIN — FENTANYL CITRATE 50 MCG: 50 INJECTION, SOLUTION INTRAMUSCULAR; INTRAVENOUS at 11:26

## 2023-11-22 RX ADMIN — ONDANSETRON 4 MG: 2 INJECTION INTRAMUSCULAR; INTRAVENOUS at 18:00

## 2023-11-22 RX ADMIN — Medication 2 L/MIN: at 19:45

## 2023-11-22 RX ADMIN — LIDOCAINE HYDROCHLORIDE 60 MG: 20 INJECTION INTRAVENOUS at 11:07

## 2023-11-22 RX ADMIN — HEPARIN SODIUM 5000 UNITS: 5000 INJECTION INTRAVENOUS; SUBCUTANEOUS at 21:52

## 2023-11-22 RX ADMIN — CEFAZOLIN 2 G: 1 INJECTION, POWDER, FOR SOLUTION INTRAMUSCULAR; INTRAVENOUS at 11:21

## 2023-11-22 RX ADMIN — HYDROMORPHONE HYDROCHLORIDE 0.5 MG: 1 INJECTION, SOLUTION INTRAMUSCULAR; INTRAVENOUS; SUBCUTANEOUS at 13:48

## 2023-11-22 RX ADMIN — AMPICILLIN AND SULBACTAM 3 G: 2; 1 INJECTION, POWDER, FOR SOLUTION INTRAVENOUS at 21:52

## 2023-11-22 RX ADMIN — SODIUM CHLORIDE, POTASSIUM CHLORIDE, SODIUM LACTATE AND CALCIUM CHLORIDE 75 ML/HR: 600; 310; 30; 20 INJECTION, SOLUTION INTRAVENOUS at 21:52

## 2023-11-22 RX ADMIN — SODIUM CHLORIDE 0.05 MCG/KG/MIN: 9 INJECTION, SOLUTION INTRAVENOUS at 11:57

## 2023-11-22 RX ADMIN — ONDANSETRON 4 MG: 2 INJECTION, SOLUTION INTRAMUSCULAR; INTRAVENOUS at 17:50

## 2023-11-22 RX ADMIN — Medication 120 MCG: at 14:33

## 2023-11-22 RX ADMIN — BACITRACIN 1 APPLICATION: 500 OINTMENT TOPICAL at 21:53

## 2023-11-22 RX ADMIN — HYDROMORPHONE HYDROCHLORIDE 0.4 MG: 1 INJECTION, SOLUTION INTRAMUSCULAR; INTRAVENOUS; SUBCUTANEOUS at 17:47

## 2023-11-22 RX ADMIN — Medication: at 19:15

## 2023-11-22 RX ADMIN — FENTANYL CITRATE 25 MCG: 50 INJECTION, SOLUTION INTRAMUSCULAR; INTRAVENOUS at 11:07

## 2023-11-22 RX ADMIN — DEXAMETHASONE SODIUM PHOSPHATE 10 MG: 10 INJECTION INTRAMUSCULAR; INTRAVENOUS at 11:07

## 2023-11-22 RX ADMIN — SODIUM CHLORIDE 6.25 MG: 9 INJECTION, SOLUTION INTRAVENOUS at 18:53

## 2023-11-22 RX ADMIN — MIDAZOLAM HYDROCHLORIDE 2 MG: 1 INJECTION, SOLUTION INTRAMUSCULAR; INTRAVENOUS at 10:51

## 2023-11-22 RX ADMIN — ACETAMINOPHEN 975 MG: 325 TABLET ORAL at 21:52

## 2023-11-22 RX ADMIN — HYDROMORPHONE HYDROCHLORIDE 0.5 MG: 1 INJECTION, SOLUTION INTRAMUSCULAR; INTRAVENOUS; SUBCUTANEOUS at 19:37

## 2023-11-22 RX ADMIN — METOPROLOL TARTRATE 3 MG: 1 INJECTION, SOLUTION INTRAVENOUS at 18:01

## 2023-11-22 RX ADMIN — Medication 160 MCG: at 14:28

## 2023-11-22 RX ADMIN — CEFAZOLIN 2 G: 1 INJECTION, POWDER, FOR SOLUTION INTRAMUSCULAR; INTRAVENOUS at 15:10

## 2023-11-22 SDOH — SOCIAL STABILITY: SOCIAL INSECURITY: ARE YOU OR HAVE YOU BEEN THREATENED OR ABUSED PHYSICALLY, EMOTIONALLY, OR SEXUALLY BY ANYONE?: NO

## 2023-11-22 SDOH — SOCIAL STABILITY: SOCIAL INSECURITY: ARE THERE ANY APPARENT SIGNS OF INJURIES/BEHAVIORS THAT COULD BE RELATED TO ABUSE/NEGLECT?: NO

## 2023-11-22 SDOH — SOCIAL STABILITY: SOCIAL INSECURITY: DO YOU FEEL ANYONE HAS EXPLOITED OR TAKEN ADVANTAGE OF YOU FINANCIALLY OR OF YOUR PERSONAL PROPERTY?: NO

## 2023-11-22 SDOH — SOCIAL STABILITY: SOCIAL INSECURITY: HAVE YOU HAD THOUGHTS OF HARMING ANYONE ELSE?: NO

## 2023-11-22 SDOH — SOCIAL STABILITY: SOCIAL INSECURITY: DOES ANYONE TRY TO KEEP YOU FROM HAVING/CONTACTING OTHER FRIENDS OR DOING THINGS OUTSIDE YOUR HOME?: NO

## 2023-11-22 SDOH — SOCIAL STABILITY: SOCIAL INSECURITY: HAS ANYONE EVER THREATENED TO HURT YOUR FAMILY OR YOUR PETS?: NO

## 2023-11-22 SDOH — SOCIAL STABILITY: SOCIAL INSECURITY: DO YOU FEEL UNSAFE GOING BACK TO THE PLACE WHERE YOU ARE LIVING?: NO

## 2023-11-22 SDOH — SOCIAL STABILITY: SOCIAL INSECURITY: WERE YOU ABLE TO COMPLETE ALL THE BEHAVIORAL HEALTH SCREENINGS?: YES

## 2023-11-22 SDOH — SOCIAL STABILITY: SOCIAL INSECURITY: ABUSE: ADULT

## 2023-11-22 ASSESSMENT — COGNITIVE AND FUNCTIONAL STATUS - GENERAL
MOBILITY SCORE: 24
PATIENT BASELINE BEDBOUND: NO
DAILY ACTIVITIY SCORE: 24

## 2023-11-22 ASSESSMENT — PAIN - FUNCTIONAL ASSESSMENT
PAIN_FUNCTIONAL_ASSESSMENT: 0-10

## 2023-11-22 ASSESSMENT — ACTIVITIES OF DAILY LIVING (ADL)
BATHING: INDEPENDENT
WALKS IN HOME: INDEPENDENT
GROOMING: INDEPENDENT
JUDGMENT_ADEQUATE_SAFELY_COMPLETE_DAILY_ACTIVITIES: YES
PATIENT'S MEMORY ADEQUATE TO SAFELY COMPLETE DAILY ACTIVITIES?: YES
DRESSING YOURSELF: INDEPENDENT
LACK_OF_TRANSPORTATION: PATIENT DECLINED
HEARING - LEFT EAR: FUNCTIONAL
ADEQUATE_TO_COMPLETE_ADL: YES
FEEDING YOURSELF: INDEPENDENT
HEARING - RIGHT EAR: FUNCTIONAL
TOILETING: INDEPENDENT

## 2023-11-22 ASSESSMENT — LIFESTYLE VARIABLES
AUDIT-C TOTAL SCORE: 0
HOW OFTEN DO YOU HAVE 6 OR MORE DRINKS ON ONE OCCASION: NEVER
HOW OFTEN DO YOU HAVE A DRINK CONTAINING ALCOHOL: NEVER
SKIP TO QUESTIONS 9-10: 1
AUDIT-C TOTAL SCORE: 0
HOW MANY STANDARD DRINKS CONTAINING ALCOHOL DO YOU HAVE ON A TYPICAL DAY: PATIENT DOES NOT DRINK
SUBSTANCE_ABUSE_PAST_12_MONTHS: NO
PRESCIPTION_ABUSE_PAST_12_MONTHS: NO

## 2023-11-22 ASSESSMENT — PAIN SCALES - GENERAL
PAINLEVEL_OUTOF10: 5 - MODERATE PAIN
PAINLEVEL_OUTOF10: 6
PAINLEVEL_OUTOF10: 0 - NO PAIN
PAINLEVEL_OUTOF10: 4
PAINLEVEL_OUTOF10: 3

## 2023-11-22 NOTE — ANESTHESIA PREPROCEDURE EVALUATION
Patient: Amita Ponce    Procedure Information       Date/Time: 11/22/23 1145    Procedures:       Thyroidectomy (Bilateral)      Dissection Neck (Right)    Location: [facility] OR 03 / [facility]    Surgeons: Vandana Dalton MD            Relevant Problems   Endocrine   (+) Papillary thyroid carcinoma (CMS/HCC)      GI   (+) Anal bleeding      Neuro/Psych   (+) Carpal tunnel syndrome of left wrist   (+) Carpal tunnel syndrome of right wrist      Musculoskeletal   (+) Carpal tunnel syndrome of left wrist   (+) Carpal tunnel syndrome of right wrist       Clinical information reviewed:                   NPO Detail:  No data recorded     Physical Exam    Airway  Mallampati: II  Neck ROM: full     Cardiovascular - normal exam     Dental   Comments: Poor dentition   Pulmonary - normal exam  Breath sounds clear to auscultation     Abdominal - normal exam  Abdomen: soft             Anesthesia Plan    ASA 2     general     intravenous induction   Anesthetic plan and risks discussed with patient.    Plan discussed with CRNA.

## 2023-11-22 NOTE — SIGNIFICANT EVENT
Endocrinology team consulted by ENT at 6:30 pm today for postop management for total thyroidectomy with right and central neck dissection.    Patient is currently in [facility].     Mrs. Ponce is a 63-year-old patient, with past medical history of chronic lymphocytic leukemia not currently on treatment, anxiety, AUB status post hysterectomy, GERD, hiatal hernia, LINO on CPAP, class II obesity (BMI 39), vitamin D deficiency, recently diagnosed with papillary thyroid cancer, was followed up by Dr. Vandana Dalton.    1/23: Noticed a small nodule in her right neck, inc size   9/23: CT neck +new cystic mass 5.2x4.1x5.6cm w/n +enhancing nodule 1.2 x 0.8 x 1.3 cm, abuts or arises from the right thyroid lobe. The thyroid gland is diffusely heterogeneous, right greater than left but can't see a discrete nodule.  There is an additional cystic appearing node at level 2 on the right measuring 1.5 x 0.8 cm.   10/20/23: US guided FNA : + PTC R sup lobe?      Prelim recs (official consult note will follow tomorrow 11/23/2023):    (Patient currently taking vitamin D tablet 1000 units 3 tablets orally daily per pharmacy note)    -Kindly check RFP, magnesium ,vitamin D, TSH, PTH 3 to 6 hours post OR tonight.  -Recheck RFP tomorrow morning, with PTH 12 to 24 hours post op.  -Goal corrected calcium > 8.5, magnesium >2  -Repeat RFP every 6 hours if corrected calcium is not at goal.  -If corrected calcium between 8-8.5, start calcium carbonate 1500 mg 1 tablet orally 2 times daily  If corrected calcium is 7.5- 8, start calcium carbonate 1500 mg 1 tablet orally 3 times daily.  If patient develops symptoms of hypocalcemia, or corrected calcium is below 7.5, kindly give IV 1 g calcium gluconate and repeat RFP.  -Patient will be examined and assessed tomorrow to evaluate the need of thyroid hormone replacement/dosing if needed.    Plan communicated to ENT team over the phone.

## 2023-11-22 NOTE — PROGRESS NOTES
Pharmacy Medication History Review    Amita Ponce is a 63 y.o. female admitted for Papillary thyroid carcinoma (CMS/[facility]). Pharmacy reviewed the patient's [id number] medications and allergies for accuracy.    The list below reflects the updated PTA list. Comments regarding how patient may be taking medications differently can be found in the Admit Orders Activity  Prior to Admission Medications   Prescriptions Last Dose Informant   VITAMIN B COMPLEX ORAL 11/21/2023 Self   Sig: Take 1 tablet by mouth once daily.   cetirizine (ZyrTEC) 10 mg tablet Past Month Self   Sig: Take 1 tablet (10 mg) by mouth once daily as needed.   cholecalciferol (Vitamin D-3) 25 MCG (1000 UT) tablet 11/21/2023 Self   Sig: Take 3 tablets (3,000 Units) by mouth once daily.   citalopram (CeleXA) 20 mg tablet 11/22/2023 Self   Sig: Take 1 tablet (20 mg) by mouth once daily.   montelukast (Singulair) 10 mg tablet 11/22/2023 Self   Sig: Take 1 tablet (10 mg) by mouth once daily.   traMADol (Ultram) 50 mg tablet Past Month Self   Sig: Take 1 tablet (50 mg) by mouth every 6 hours if needed for severe pain (7 - 10) for up to 14 days.      Facility-Administered Medications: None        The list below reflects the updated allergy list. Please review each documented allergy for additional clarification and justification.  Allergies  Reviewed by Yoko Hutnley PharmD on 11/22/2023        Severity Reactions Comments    Hydrocodone-acetaminophen Low Agitation Patient stated she get hot flashed,feverish. Tolerates acetaminophen            Patient was unable to be assessed for M2B at discharge. Pharmacy has been updated to [facility]. M2B service not offered prior to surgery, please reassess prior to patient discharge if Meds to Beds is desired.    Sources used to complete the med history include: Patient interview - good historian of medications/ Pharmacy - [facility]/ Chart review/ [facility] - tramadol 50mg #28/7 filled 11/3/23    Yoko Huntley  PharmD  Transitions of Care Pharmacist   Meds Ambulatory and Retail Services  Please reach out via Secure Chat for questions, or if no response call [phone] or vocera [facility]

## 2023-11-23 LAB
25(OH)D3 SERPL-MCNC: 55 NG/ML (ref 30–100)
ALBUMIN SERPL BCP-MCNC: 3.4 G/DL (ref 3.4–5)
ALBUMIN SERPL BCP-MCNC: 3.6 G/DL (ref 3.4–5)
ANION GAP SERPL CALC-SCNC: 11 MMOL/L (ref 10–20)
ANION GAP SERPL CALC-SCNC: 7 MMOL/L (ref 10–20)
BUN SERPL-MCNC: 10 MG/DL (ref 6–23)
BUN SERPL-MCNC: 7 MG/DL (ref 6–23)
CALCIUM SERPL-MCNC: 8 MG/DL (ref 8.6–10.6)
CALCIUM SERPL-MCNC: 8.8 MG/DL (ref 8.6–10.6)
CHLORIDE SERPL-SCNC: 107 MMOL/L (ref 98–107)
CHLORIDE SERPL-SCNC: 108 MMOL/L (ref 98–107)
CO2 SERPL-SCNC: 28 MMOL/L (ref 21–32)
CO2 SERPL-SCNC: 30 MMOL/L (ref 21–32)
CREAT SERPL-MCNC: 0.6 MG/DL (ref 0.5–1.05)
CREAT SERPL-MCNC: 0.65 MG/DL (ref 0.5–1.05)
ERYTHROCYTE [DISTWIDTH] IN BLOOD BY AUTOMATED COUNT: 13.9 % (ref 11.5–14.5)
GFR SERPL CREATININE-BSD FRML MDRD: >90 ML/MIN/1.73M*2
GFR SERPL CREATININE-BSD FRML MDRD: >90 ML/MIN/1.73M*2
GLUCOSE SERPL-MCNC: 101 MG/DL (ref 74–99)
GLUCOSE SERPL-MCNC: 80 MG/DL (ref 74–99)
HCT VFR BLD AUTO: 34 % (ref 36–46)
HGB BLD-MCNC: 11 G/DL (ref 12–16)
MCH RBC QN AUTO: 29.2 PG (ref 26–34)
MCHC RBC AUTO-ENTMCNC: 32.4 G/DL (ref 32–36)
MCV RBC AUTO: 90 FL (ref 80–100)
NRBC BLD-RTO: 0 /100 WBCS (ref 0–0)
PHOSPHATE SERPL-MCNC: 2 MG/DL (ref 2.5–4.9)
PHOSPHATE SERPL-MCNC: 3.5 MG/DL (ref 2.5–4.9)
PLATELET # BLD AUTO: 231 X10*3/UL (ref 150–450)
POTASSIUM SERPL-SCNC: 3.3 MMOL/L (ref 3.5–5.3)
POTASSIUM SERPL-SCNC: 3.9 MMOL/L (ref 3.5–5.3)
PTH-INTACT SERPL-MCNC: 79.2 PG/ML (ref 18.5–88)
RBC # BLD AUTO: 3.77 X10*6/UL (ref 4–5.2)
SODIUM SERPL-SCNC: 142 MMOL/L (ref 136–145)
SODIUM SERPL-SCNC: 142 MMOL/L (ref 136–145)
WBC # BLD AUTO: 15.4 X10*3/UL (ref 4.4–11.3)

## 2023-11-23 PROCEDURE — 2500000001 HC RX 250 WO HCPCS SELF ADMINISTERED DRUGS (ALT 637 FOR MEDICARE OP): Performed by: STUDENT IN AN ORGANIZED HEALTH CARE EDUCATION/TRAINING PROGRAM

## 2023-11-23 PROCEDURE — C9113 INJ PANTOPRAZOLE SODIUM, VIA: HCPCS | Performed by: STUDENT IN AN ORGANIZED HEALTH CARE EDUCATION/TRAINING PROGRAM

## 2023-11-23 PROCEDURE — 80069 RENAL FUNCTION PANEL: CPT | Performed by: STUDENT IN AN ORGANIZED HEALTH CARE EDUCATION/TRAINING PROGRAM

## 2023-11-23 PROCEDURE — 99024 POSTOP FOLLOW-UP VISIT: CPT | Performed by: OTOLARYNGOLOGY

## 2023-11-23 PROCEDURE — 36415 COLL VENOUS BLD VENIPUNCTURE: CPT | Performed by: STUDENT IN AN ORGANIZED HEALTH CARE EDUCATION/TRAINING PROGRAM

## 2023-11-23 PROCEDURE — 99222 1ST HOSP IP/OBS MODERATE 55: CPT

## 2023-11-23 PROCEDURE — 83970 ASSAY OF PARATHORMONE: CPT | Performed by: STUDENT IN AN ORGANIZED HEALTH CARE EDUCATION/TRAINING PROGRAM

## 2023-11-23 PROCEDURE — 1200000002 HC GENERAL ROOM WITH TELEMETRY DAILY

## 2023-11-23 PROCEDURE — 85027 COMPLETE CBC AUTOMATED: CPT | Performed by: STUDENT IN AN ORGANIZED HEALTH CARE EDUCATION/TRAINING PROGRAM

## 2023-11-23 PROCEDURE — 2500000004 HC RX 250 GENERAL PHARMACY W/ HCPCS (ALT 636 FOR OP/ED): Performed by: STUDENT IN AN ORGANIZED HEALTH CARE EDUCATION/TRAINING PROGRAM

## 2023-11-23 PROCEDURE — 2500000001 HC RX 250 WO HCPCS SELF ADMINISTERED DRUGS (ALT 637 FOR MEDICARE OP)

## 2023-11-23 PROCEDURE — 82306 VITAMIN D 25 HYDROXY: CPT | Performed by: STUDENT IN AN ORGANIZED HEALTH CARE EDUCATION/TRAINING PROGRAM

## 2023-11-23 PROCEDURE — 96372 THER/PROPH/DIAG INJ SC/IM: CPT | Performed by: STUDENT IN AN ORGANIZED HEALTH CARE EDUCATION/TRAINING PROGRAM

## 2023-11-23 RX ORDER — TRAMADOL HYDROCHLORIDE 50 MG/1
50 TABLET ORAL EVERY 6 HOURS PRN
Status: DISCONTINUED | OUTPATIENT
Start: 2023-11-23 | End: 2023-11-25 | Stop reason: HOSPADM

## 2023-11-23 RX ORDER — LEVOTHYROXINE SODIUM 125 UG/1
125 TABLET ORAL DAILY
Status: DISCONTINUED | OUTPATIENT
Start: 2023-11-23 | End: 2023-11-25 | Stop reason: HOSPADM

## 2023-11-23 RX ADMIN — POLYETHYLENE GLYCOL 3350 17 G: 17 POWDER, FOR SOLUTION ORAL at 09:22

## 2023-11-23 RX ADMIN — Medication: at 14:45

## 2023-11-23 RX ADMIN — ACETAMINOPHEN 975 MG: 325 TABLET ORAL at 05:46

## 2023-11-23 RX ADMIN — HEPARIN SODIUM 5000 UNITS: 5000 INJECTION INTRAVENOUS; SUBCUTANEOUS at 05:45

## 2023-11-23 RX ADMIN — LEVOTHYROXINE SODIUM 125 MCG: 125 TABLET ORAL at 16:58

## 2023-11-23 RX ADMIN — ACETAMINOPHEN 975 MG: 325 TABLET ORAL at 14:30

## 2023-11-23 RX ADMIN — HEPARIN SODIUM 5000 UNITS: 5000 INJECTION INTRAVENOUS; SUBCUTANEOUS at 21:48

## 2023-11-23 RX ADMIN — TRAMADOL HYDROCHLORIDE 50 MG: 50 TABLET, COATED ORAL at 06:02

## 2023-11-23 RX ADMIN — SODIUM CHLORIDE, POTASSIUM CHLORIDE, SODIUM LACTATE AND CALCIUM CHLORIDE 75 ML/HR: 600; 310; 30; 20 INJECTION, SOLUTION INTRAVENOUS at 21:49

## 2023-11-23 RX ADMIN — HEPARIN SODIUM 5000 UNITS: 5000 INJECTION INTRAVENOUS; SUBCUTANEOUS at 12:33

## 2023-11-23 RX ADMIN — BACITRACIN 1 APPLICATION: 500 OINTMENT TOPICAL at 21:48

## 2023-11-23 RX ADMIN — BACITRACIN 1 APPLICATION: 500 OINTMENT TOPICAL at 09:22

## 2023-11-23 RX ADMIN — CITALOPRAM HYDROBROMIDE 20 MG: 20 TABLET ORAL at 09:22

## 2023-11-23 RX ADMIN — HYDROGEN PEROXIDE 1 APPLICATION: 30 LIQUID TOPICAL at 09:00

## 2023-11-23 RX ADMIN — HYDROGEN PEROXIDE 1 APPLICATION: 30 LIQUID TOPICAL at 21:00

## 2023-11-23 RX ADMIN — TRAMADOL HYDROCHLORIDE 50 MG: 50 TABLET, COATED ORAL at 12:33

## 2023-11-23 RX ADMIN — ACETAMINOPHEN 975 MG: 325 TABLET ORAL at 21:48

## 2023-11-23 RX ADMIN — Medication 3000 UNITS: at 09:22

## 2023-11-23 RX ADMIN — PANTOPRAZOLE SODIUM 40 MG: 40 INJECTION, POWDER, FOR SOLUTION INTRAVENOUS at 09:22

## 2023-11-23 RX ADMIN — BACITRACIN 1 APPLICATION: 500 OINTMENT TOPICAL at 14:31

## 2023-11-23 RX ADMIN — HYDROGEN PEROXIDE 1 APPLICATION: 30 LIQUID TOPICAL at 14:31

## 2023-11-23 ASSESSMENT — COGNITIVE AND FUNCTIONAL STATUS - GENERAL
MOBILITY SCORE: 24
DAILY ACTIVITIY SCORE: 24

## 2023-11-23 ASSESSMENT — PAIN - FUNCTIONAL ASSESSMENT
PAIN_FUNCTIONAL_ASSESSMENT: 0-10
PAIN_FUNCTIONAL_ASSESSMENT: 0-10

## 2023-11-23 ASSESSMENT — COLUMBIA-SUICIDE SEVERITY RATING SCALE - C-SSRS
2. HAVE YOU ACTUALLY HAD ANY THOUGHTS OF KILLING YOURSELF?: NO
1. IN THE PAST MONTH, HAVE YOU WISHED YOU WERE DEAD OR WISHED YOU COULD GO TO SLEEP AND NOT WAKE UP?: NO
6. HAVE YOU EVER DONE ANYTHING, STARTED TO DO ANYTHING, OR PREPARED TO DO ANYTHING TO END YOUR LIFE?: NO

## 2023-11-23 ASSESSMENT — PAIN SCALES - GENERAL
PAINLEVEL_OUTOF10: 6
PAINLEVEL_OUTOF10: 3
PAINLEVEL_OUTOF10: 6

## 2023-11-23 NOTE — OP NOTE
Total Thyroidectomy (B), Dissection Neck (R) Operative Note     Date: 2023  OR Location: [facility] OR    Name: Amita Ponce, : 1960, Age: 63 y.o., MRN: [id number], Sex: female    Diagnosis  Pre-op Diagnosis     * Papillary thyroid carcinoma (CMS/HCC) [C73]    * Cervical lymph node metastasis Post-op Diagnosis     * Papillary thyroid carcinoma (CMS/HCC) [C73]    * Cervical lymph node metastasis     Procedures  Total Thyroidectomy 32943  2.   Right radical neck dissection - 22 modifier  3.   Central neck dissection  4.   Right superior parathyroid gland reimplantation    Surgeons      * Vandana Dalton - Primary    Resident/Fellow/Other Assistant:  Surgeon(s) and Role:     * Golden Street MD - Resident - Assisting     * Robert Garza MD - Fellow    Procedure Summary  Anesthesia: General  ASA: II  Anesthesia Staff: Anesthesiologist: Boubacar Salazar MD; Ugo Hagan MD  CRNA: Shira Nelson APRN-CRNA; ALEXSANDRA Clifton-CRNA  C-AA: KARO Stewart  Estimated Blood Loss: 300mL  Intra-op Medications:   Medication Name Total Dose   sodium chloride 0.9 % irrigation solution 1,000 mL              Anesthesia Record               Intraprocedure I/O Totals          Intake    LR 1800.00 mL    Remifentanil Drip 0.00 mL    The total shown is the total volume documented since Anesthesia Start was filed.    Propofol Drip 0.00 mL    The total shown is the total volume documented since Anesthesia Start was filed.    Total Intake 1800 mL       Output    Urine 400 mL    Est. Blood Loss 200 mL    Total Output 600 mL       Net    Net Volume 1200 mL          Specimen:   ID Type Source Tests Collected by Time   1 : ADDITIONAL RIGHT LEVEL 2 NECK DISSECTION Tissue NECK DISSECTION RIGHT (SPECIFY LEVELS) SURGICAL PATHOLOGY EXAM Vandana Dalton MD 2023 1325   2 : ? RIGHT INFERIOR PARATHYROID Tissue PARATHYROID BIOPSY RIGHT SURGICAL PATHOLOGY EXAM Vandana Dalton MD 2023 1550   3 : RIGHT  HEMITHYROIDECTOMY AND ISTHMUS AND RIGHT NECK DISSECTION LEVELS 2, 3, 4 SINGLE STITCH MEDIAL ISTHMUS DOUBLE STITCH SUPERIOR LEVEL 2 Tissue THYROID HEMITHYROIDECTOMY RIGHT SURGICAL PATHOLOGY EXAM Vandana Dalton MD 11/22/2023 1616   4 : CENTRAL NECK DISSECTION Tissue SOFT TISSUE RESECTION SURGICAL PATHOLOGY EXAM Vandana Dalton MD 11/22/2023 1619   5 : LEFT THYROID SHORT STITCH SUPERIOR POLE LONG STITCH MEDIAL Tissue THYROID HEMITHYROIDECTOMY LEFT SURGICAL PATHOLOGY EXAM Vandana Dalton MD 11/22/2023 1706        Staff:   Circulator: Madison Hernández RN  Relief Circulator: Shena Vargas RN  Relief Scrub: Tami Rai RN; Morales Ford  Scrub Person: Ronni Bustamante RN         Drains and/or Catheters:   Closed/Suction Drain Anterior Neck Bulb 10 Fr. (Active)   Site Description Healing 11/23/23 0026   Dressing Status Clean;Dry 11/23/23 0026   Drainage Appearance Bloody 11/22/23 1726   Status To bulb suction 11/22/23 1726   Number of Sutures Removed 1 11/22/23 1726   Output (mL) 10 mL 11/22/23 2000       Closed/Suction Drain Anterior Neck Bulb 10 Fr. (Active)   Site Description Healing 11/23/23 0026   Dressing Status Clean;Dry 11/23/23 0026   Output (mL) 10 mL 11/22/23 2000       Urethral Catheter Non-latex 16 Fr. (Active)   Site Assessment Clean;Skin intact 11/22/23 2017   Output (mL) 150 mL 11/22/23 2000       Findings: Right large neck mass, involving right SCM, supra and infrahyoid strap muscles, thyroid gland, CNXI, and Internal jugular vein.  Direct involvement of the internal jugular vein.  Intra-operative injury of internal jugular vein with sacrifice of internal jugular vein and cranial nerve XI, bilateral recurrent laryngeal nerve intact and stimulaing at 0.5 mA at end of case    Indications: Amita Ponce is an 63 y.o. female who is having surgery for Papillary thyroid carcinoma (CMS/HCC) [C73].     The patient was seen in the preoperative area. The risks, benefits, complications, treatment  options, non-operative alternatives, expected recovery and outcomes were discussed with the patient. The possibilities of reaction to medication, pulmonary aspiration, injury to surrounding structures, bleeding, recurrent infection, the need for additional procedures, failure to diagnose a condition, and creating a complication requiring transfusion or operation were discussed with the patient. The patient concurred with the proposed plan, giving informed consent.  The site of surgery was properly noted/marked if necessary per policy. The patient has been actively warmed in preoperative area. Preoperative antibiotics have been ordered and given within 1 hours of incision. Venous thrombosis prophylaxis have been ordered including bilateral sequential compression devices    Procedure Details:   The patient was brought back to the operating room and a time out was performed by Dr. Dalton. The patient was then induced under general anesthesia and was intubated using orotracheal tube. The patient was then rotated 90 degrees. The patient was then prepped and draped in the standard fashion. A final pre-incision pause was then performed to verify the correct procedure and patient prior to starting the planned procedure.     At this point, we began our right mass excision and neck dissection.  The planned incision was marked out and the patient was prepped and drapped in a standard sterile fashion. A 15 blade was used to make incision through skin and bovie elextrocautery was used for dissection into a subplatysmal plane and subplatysmal flap elevation. Blue stays were used for retraction of the skin and soft tissues. We began by dissecting out the mass medially until native thryoid tissue was appreciated and medially until  the SCM was identified. The mass was tightly adherence to both structures and a cuff of SCM was  to go with the specimen en bloc. We then turned our attention to the superior and inferior  borders of the mass, dissecting using a combination of bovie electocautery and harmonic ultrasonic scalpel to identify the digastric muscle, submandibular gland, and facial vein superiorly and the transverse cervical vessels inferiorly given the mass effect of the lesion on the natural anatomy of the neck. At this point the mass was attempted to be dissected off of the internal jugular vein. A potential plane was followed from inferior to superior but the mass was too closely adherence for separation. Similar findings were seen with cranial nerve XI. The decision was made to sacrifice these structures for oncologic resection. There was extensive adherence between the right neck mass and the internal jugular vein/SCM and CNXI.  The dissection of these was extensive and took >50% of the normal amount of time.  The internal jugular vein was dissected superiorly and a vessel loop placed. We then turned our attention to the inferior portion. During dissection, the jugular vein was injured with loss of about 150mL of blood. The blood vessel was clamped superiorly and inferiorly to the injury and then ligated using 2-0 silk in a hand tied and stick-tie fashion. Medium clips were placed in addition to this on the patient side of the vessel. The same procedure was performed superiorly. We then continued dissection of the mass and attached fibrofatty packet in the lateral to medial fashion. The hypoglossal nerve was identified and protected. Similarly with cranial nerve XI and the carotid artery. After lateral dissection the thyroid gland was appreciated and the superior and inferior thyroid vessels were ligated. The thyroid was then retracted medially and the RLN was identified at the entry point into the larynx. This was then dissected inferiorly down to the clavicle and  from the thyroid. The thyroid was then dissected off of the trachea and  from the left thyroid gland at the isthmus. The specimen of the  neck mass, right hemithyroid, and neck dissection of levels II-V was sent en bloc for permanent pathology. The right superior parathryoid was identified and then confirmed on frozen pathology. This was then saved for reimplantation.     We then turned our attention to the left thyroid gland. The strap muscles were identified and elevated off the left thyroid and isthmus, and retractors were placed.  The superior pole was identified, isolated, clipped, and cut.  We were careful to sweep the superior laryngeal nerve towards the laryngeal framework.  A Gen was placed on the superior pole being careful not to rupture any nodules/masses, as we entered Joll's space and mobilized the superior pole further.  It was retracted down and all the soft tissue down to the joint space was freed. We then identified the lateral aspect of the gland and the middle thyroid vein which was isolated, clipped, and cut.  The inferior pole vessels were isolated, clipped, and cut.  The fascia was swept off the gland.  The trachea had been identified.  The inferior pole was mobilized off the trachea and the tubercle of Zuckerkandl was identified, it was mobilized up off the recurrent nerve which was encountered at this point, heading towards the joint space.  The parathyroids were released and protected.  All the vessels lateral to the recurrent nerve were isolated, clipped, and cut.  Lowery's ligament was released.  The vessels medial to the nerve were isolated, clipped, and cut.     Following completion of the thyroidectomy attention was turned to the central neck dissection which was then completed with removal of residual fibrofatty tissue at the inferior portion of level VI. The central fibrofatty tissue was removed from carotid artery bilaterally.      The right parathyroid was then pureed using scissors and reimplanted into the right SCM and secured using medium clips.     Two drains were placed and hemostasis was achieved. The wound  was irrigated. The straps were re-approximated and the incision closed in layers using 3-0 vicryl and 5-0 fast gut.     This concluded the procedure. The patient was cleaned of prep and debris and then turned back to our anesthesia colleagues for emergence. Emergence was uneventful and the patient was returned to the PACU for recovery.     Dr. Dalton was present for the entire procedure.       Complications:   injury of right internal jugular vein with 200mL of blood loss.      Disposition: PACU - hemodynamically stable.  Condition: stable         Additional Details: N/A    Attending Attestation: I was present and scrubbed for the entire procedure.    Vandana Dalton  Phone Number: [phone]       [place]

## 2023-11-23 NOTE — BRIEF OP NOTE
Date: 2023  OR Location: [facility] OR    Name: Amita Ponce, : 1960, Age: 63 y.o., MRN: [id number], Sex: female    Diagnosis  Pre-op Diagnosis     * Papillary thyroid carcinoma (CMS/HCC) [C73] Post-op Diagnosis     * Papillary thyroid carcinoma (CMS/HCC) [C73]     Procedures  Thyroidectomy  75467 - [place] THYROIDECTOMY TOTAL/COMPLETE    Dissection Neck  22262 - [place] CERVICAL LYMPHADEC MODIFIED RADICAL NECK DSJ      Surgeons      * Vandana Dalton - Primary    Resident/Fellow/Other Assistant:  Surgeon(s) and Role:     * Golden Street MD - Resident - Assisting     * Robert Garza MD - Fellow    Procedure Summary  Anesthesia: General  ASA: II  Anesthesia Staff: Anesthesiologist: Boubacar Salazar MD; Ugo Hagan MD  CRNA: Shira Nelson APRN-CRNA; Fidencio Fonseca APRN-CRNA  C-AA: KARO Stewart  Estimated Blood Loss: 300mL  Intra-op Medications:   Medication Name Total Dose   sodium chloride 0.9 % irrigation solution 1,000 mL              Anesthesia Record               Intraprocedure I/O Totals          Intake    LR 1800.00 mL    Remifentanil Drip 0.00 mL    The total shown is the total volume documented since Anesthesia Start was filed.    Propofol Drip 0.00 mL    The total shown is the total volume documented since Anesthesia Start was filed.    Total Intake 1800 mL       Output    Urine 400 mL    Est. Blood Loss 200 mL    Total Output 600 mL       Net    Net Volume 1200 mL          Specimen:   ID Type Source Tests Collected by Time   1 : ADDITIONAL RIGHT LEVEL 2 NECK DISSECTION Tissue NECK DISSECTION RIGHT (SPECIFY LEVELS) SURGICAL PATHOLOGY EXAM Vandana Dalton MD 2023 1325   2 : ? RIGHT INFERIOR PARATHYROID Tissue PARATHYROID BIOPSY RIGHT SURGICAL PATHOLOGY EXAM Vandana Dalton MD 2023 1550   3 : RIGHT HEMITHYROIDECTOMY AND ISTHMUS AND RIGHT NECK DISSECTION LEVELS 2, 3, 4 SINGLE STITCH MEDIAL ISTHMUS DOUBLE STITCH SUPERIOR LEVEL 2 Tissue THYROID HEMITHYROIDECTOMY  RIGHT SURGICAL PATHOLOGY EXAM Vandana Dalton MD 11/22/2023 1616   4 : CENTRAL NECK DISSECTION Tissue SOFT TISSUE RESECTION SURGICAL PATHOLOGY EXAM Vandana Dalton MD 11/22/2023 1619   5 : LEFT THYROID SHORT STITCH SUPERIOR POLE LONG STITCH MEDIAL Tissue THYROID HEMITHYROIDECTOMY LEFT SURGICAL PATHOLOGY EXAM Vandana Dalton MD 11/22/2023 1706        Staff:   Circulator: Madison Hernández RN  Relief Circulator: Shena Vargas RN  Relief Scrub: Tami Rai RN; Morales Ford  Scrub Person: Ronni Bustamante RN      Findings: Right large neck mass, involving right SCM, supra and infrahyoid strap muscles, thyroid gland, CNXI, and Internal jugular vein intra-operative injury of internal jugular vein, sacrifice of internal jugular vein and cranial nerve XI, bilateral recurrent laryngeal nerve intact and stimulaing at 0.5 mA at end of case    Complications:   Internal Jugular Vein injury, controlled with ligation      Disposition: PACU - hemodynamically stable.  Condition: stable  Specimens Collected:   ID Type Source Tests Collected by Time   1 : ADDITIONAL RIGHT LEVEL 2 NECK DISSECTION Tissue NECK DISSECTION RIGHT (SPECIFY LEVELS) SURGICAL PATHOLOGY EXAM Vandana Dalton MD 11/22/2023 1325   2 : ? RIGHT INFERIOR PARATHYROID Tissue PARATHYROID BIOPSY RIGHT SURGICAL PATHOLOGY EXAM Vandana Dalton MD 11/22/2023 1550   3 : RIGHT HEMITHYROIDECTOMY AND ISTHMUS AND RIGHT NECK DISSECTION LEVELS 2, 3, 4 SINGLE STITCH MEDIAL ISTHMUS DOUBLE STITCH SUPERIOR LEVEL 2 Tissue THYROID HEMITHYROIDECTOMY RIGHT SURGICAL PATHOLOGY EXAM Vandana Dalton MD 11/22/2023 1616   4 : CENTRAL NECK DISSECTION Tissue SOFT TISSUE RESECTION SURGICAL PATHOLOGY EXAM Vandana Dalton MD 11/22/2023 1619   5 : LEFT THYROID SHORT STITCH SUPERIOR POLE LONG STITCH MEDIAL Tissue THYROID HEMITHYROIDECTOMY LEFT SURGICAL PATHOLOGY EXAM Vandana Dalton MD 11/22/2023 1706     Attending Attestation: I was present and scrubbed for the entire  procedure.    Vandana Dalton  Phone Number: [phone]

## 2023-11-23 NOTE — CONSULTS
Mrs. Ponce is a 63-year-old patient, with past medical history of chronic lymphocytic leukemia diagnosed in 2012 , not currently on treatment, anxiety, AUB status post hysterectomy, GERD, hiatal hernia, LINO on CPAP, class II obesity (BMI 39), vitamin D deficiency, recently diagnosed with papillary thyroid cancer, was followed up by Dr. Vandana Dalton.       Background:  --------------  1/23: Noticed a small nodule in her right neck, inc size gradually over the past few months, patient started noticing difficulty breathing and swallowing. WBC went up to 15k in 8/2023, however her LN did not icease in size of CT neck, Patient was referred by her oncologist to ENT for the following findings on CT:  9/23: CT neck +new cystic mass 5.2x4.1x5.6cm w/n +enhancing nodule 1.2 x 0.8 x 1.3 cm, abuts or arises from the right thyroid lobe. The thyroid gland is diffusely heterogeneous, right greater than left but can't see a discrete nodule.  There is an additional cystic appearing node at level 2 on the right measuring 1.5 x 0.8 cm.   10/20/23: US guided FNA : + PTC R sup lobe    Patient mentioned she has chronic night sweats  Her history of CML, feels cold during the day, have chronic constipation??,   Denies any chest pain palpitations, tremors the past few months.  Weight gain 9 lbs since 10/2023?    Patient was seen today, day 1 postop, had no major complaints.  Denies any tingling or numbness around the mouth or the fingertips, had not had any bowel movements after surgery yet, no chest pain abnormal heart rhythm, or shortness of breath, no significant neck pain.  No difficulty swallowing pills, currently still on liquid diet.    Past Medical History:   Diagnosis Date    Anxiety     CLL (chronic lymphocytic leukemia) (CMS/HCC)     Dysfunctional uterine bleeding     GERD (gastroesophageal reflux disease)     Hiatal hernia     Personal history of leukemia     History of chronic lymphocytic leukemia    Sleep apnea     uses  CPAP    Thyroid cancer (CMS/HCC)     malignant papillary carcinoma    Unspecified visual loss     Vision loss        Past Surgical History:   Procedure Laterality Date    APPENDECTOMY      CHOLECYSTECTOMY      HEMORRHOID SURGERY  2018    Hemorrhoidectomy    HYSTERECTOMY  2018    US GUIDED THYROID BIOPSY  10/20/2023    US GUIDED THYROID BIOPSY 10/20/2023 Tara Peralta MD [facility]      Family History   Problem Relation Name Age of Onset    Lung cancer Mother      Colon cancer Mother      Colon cancer Maternal Grandmother        Allergies   Allergen Reactions    Hydrocodone-Acetaminophen Agitation     Patient stated she get hot flashed,feverish.  Tolerates acetaminophen      Social History     Tobacco Use    Smoking status: Former     Packs/day: 0.25     Years: 3.00     Additional pack years: 0.00     Total pack years: 0.75     Types: Cigarettes     Start date:      Quit date:      Years since quittin.9     Passive exposure: Never    Smokeless tobacco: Former     Quit date:    Vaping Use    Vaping Use: Never used   Substance Use Topics    Alcohol use: Never    Drug use: Never      No current facility-administered medications on file prior to encounter.     Current Outpatient Medications on File Prior to Encounter   Medication Sig Dispense Refill    cetirizine (ZyrTEC) 10 mg tablet Take 1 tablet (10 mg) by mouth once daily as needed.      cholecalciferol (Vitamin D-3) 25 MCG (1000 UT) tablet Take 3 tablets (3,000 Units) by mouth once daily.      citalopram (CeleXA) 20 mg tablet Take 1 tablet (20 mg) by mouth once daily.      montelukast (Singulair) 10 mg tablet Take 1 tablet (10 mg) by mouth once daily.      [] traMADol (Ultram) 50 mg tablet Take 1 tablet (50 mg) by mouth every 6 hours if needed for severe pain (7 - 10) for up to 14 days. 56 tablet 0    VITAMIN B COMPLEX ORAL Take 1 tablet by mouth once daily.      [DISCONTINUED] nystatin, bulk, 15 billion unit powder USE AS DIRECTED.  "Apply to perianal area twice a day          Visit Vitals  /68 (BP Location: Right arm, Patient Position: Lying)   Pulse 63   Temp 36.8 °C (98.2 °F) (Temporal)   Resp 18   Ht 1.676 m (5' 6\")   Wt 110 kg (242 lb 4.6 oz)   SpO2 93%   BMI 39.11 kg/m²   OB Status Postmenopausal   Smoking Status Former   BSA 2.26 m²        PE:  General: CCO X3, NAD, on 2 L nasal cannula, comfortable in bed  HEENT:Chvostek sign negative, surgical site clean no oozing or to anterior cervical drains  Chest: G MONICA on bilateral anterior auscultation.  Regular S1-S2.  Abdomen: Soft nontender nondistended  Extremities: Warm, no lower extremity swelling, no tremor  No delayed relaxation phase of DTRs    Scheduled medications  acetaminophen, 975 mg, oral, q8h TIANA   Or  acetaminophen, 1,000 mg, oral, q8h TIANA   Or  acetaminophen, 1,000 mg, g-tube, q8h TIANA  bacitracin, , Topical, TID  cholecalciferol, 3,000 Units, oral, Daily  citalopram, 20 mg, oral, Daily  heparin (porcine), 5,000 Units, subcutaneous, q8h  hydrogen peroxide, 1 Application, Topical, TID  pantoprazole, 40 mg, intravenous, Daily  polyethylene glycol, 17 g, oral, Daily  [START ON 11/24/2023] white petrolatum, 1 Application, Topical, TID      Continuous medications  HYDROmorphone,   lactated Ringer's, 75 mL/hr, Last Rate: 75 mL/hr (11/23/23 0511)      PRN medications  PRN medications: calcium carbonate, calcium carbonate, calcium gluconate, naloxone, naloxone, ondansetron **OR** ondansetron, oxygen, traMADol       Results for orders placed or performed during the hospital encounter of 11/22/23 (from the past 24 hour(s))   PTH, Intact   Result Value Ref Range    Parathyroid Hormone, Intact 50.6 18.5 - 88.0 pg/mL   Renal function panel   Result Value Ref Range    Glucose 169 (H) 74 - 99 mg/dL    Sodium 143 136 - 145 mmol/L    Potassium 4.4 3.5 - 5.3 mmol/L    Chloride 107 98 - 107 mmol/L    Bicarbonate 26 21 - 32 mmol/L    Anion Gap 14 10 - 20 mmol/L    Urea Nitrogen 14 6 - 23 mg/dL "    Creatinine 0.74 0.50 - 1.05 mg/dL    eGFR >90 >60 mL/min/1.73m*2    Calcium 9.6 8.6 - 10.6 mg/dL    Phosphorus 3.7 2.5 - 4.9 mg/dL    Albumin 4.2 3.4 - 5.0 g/dL   Magnesium   Result Value Ref Range    Magnesium 2.01 1.60 - 2.40 mg/dL   TSH   Result Value Ref Range    Thyroid Stimulating Hormone 0.73 0.44 - 3.98 mIU/L   Vitamin D 25-Hydroxy,Total (for eval of Vitamin D levels)   Result Value Ref Range    Vitamin D, 25-Hydroxy, Total 63 30 - 100 ng/mL   Renal Function Panel   Result Value Ref Range    Glucose 147 (H) 74 - 99 mg/dL    Sodium 142 136 - 145 mmol/L    Potassium 4.2 3.5 - 5.3 mmol/L    Chloride 106 98 - 107 mmol/L    Bicarbonate 26 21 - 32 mmol/L    Anion Gap 14 10 - 20 mmol/L    Urea Nitrogen 13 6 - 23 mg/dL    Creatinine 0.76 0.50 - 1.05 mg/dL    eGFR 89 >60 mL/min/1.73m*2    Calcium 9.1 8.6 - 10.6 mg/dL    Phosphorus 4.2 2.5 - 4.9 mg/dL    Albumin 4.2 3.4 - 5.0 g/dL   Magnesium   Result Value Ref Range    Magnesium 2.12 1.60 - 2.40 mg/dL   CBC   Result Value Ref Range    WBC 15.4 (H) 4.4 - 11.3 x10*3/uL    nRBC 0.0 0.0 - 0.0 /100 WBCs    RBC 3.77 (L) 4.00 - 5.20 x10*6/uL    Hemoglobin 11.0 (L) 12.0 - 16.0 g/dL    Hematocrit 34.0 (L) 36.0 - 46.0 %    MCV 90 80 - 100 fL    MCH 29.2 26.0 - 34.0 pg    MCHC 32.4 32.0 - 36.0 g/dL    RDW 13.9 11.5 - 14.5 %    Platelets 231 150 - 450 x10*3/uL      Imaging:  ----------  -9/9/2023:  CT neck soft tissue with iv contrast:  IMPRESSION:  There is a new cystic mass with an enhancing nodule in the right  lower neck measuring 5.2 x 4.1 x 5.6 cm. The enhancing nodule  measures 1.2 x 0.8 x 1.3 cm. The inferior component of the mass  either abuts or arises from the right thyroid lobe. The thyroid gland  is diffusely heterogeneous, right greater than left. Consider  correlation with tissue sampling.  There is an additional cystic appearing node at level 2 on the right  measuring 1.5 x 0.8 cm.  There are bilateral prominent lymph nodes decreased in size since the  prior  exam 10/25/2012 compatible with the patient's history of  lymphoma.    -10/20/2023:  US thyroid  Heterogeneous thyroid with multiple nodules as detailed above. The  largest one demonstrates solid and cystic component. Please refer to  ultrasound biopsy performed the same day.  Mildly enlarged cervical lymph nodes as detailed above. None of the  nodes demonstrate this typical fatty hilum.    -10/20/2023:  US guided FNA biopsy:  Technically successful ultrasound-guided fine-needle aspiration, core  biopsy as well as fluid aspiration of a solid and cystic thyroid  nodule in the right lobe of the thyroid gland.      Assessment/plan:  --------------------  Mrs. Ponce is a 63-year-old patient, with past medical history of chronic lymphocytic leukemia diagnosed in 2012 , not currently on treatment, anxiety, AUB status post hysterectomy, GERD, hiatal hernia, LINO on CPAP, class II obesity (BMI 39), vitamin D deficiency, recently diagnosed with papillary thyroid cancer, was followed up by Dr. Vandana Dalton.    Endocrinology consulted for postop management for total thyroidectomy with right and central neck dissection.       Day 1 post, no evidence of postop hypoparathyroidism or symptoms of hypocalcemia or low vitamin D.    Recommendations:  -Would recommend levothyroxine 125 mcg 1 tablet orally daily, as of today(kindly make sure medication is being administered 1 hour away from meals or other pills)  -Repeat RFP in p.m. and tomorrow morning  Goal corrected calcium > 8.5, magnesium >2  Repeat RFP every 6 hours if corrected calcium is not at goal.  If corrected calcium between 8-8.5, start calcium carbonate 1500 mg 1 tablet orally 2 times daily  If corrected calcium is 7.5- 8, start calcium carbonate 1500 mg 1 tablet orally 3 times daily.  If patient develops symptoms of hypocalcemia, or corrected calcium is below 7.5, kindly give IV 1 g calcium gluconate and repeat RFP.  -Follow-up with endocrinology (Dr. Marycarmen Clark),  after 4 weeks in the clinic ( will be contacted on 11/24)  -Repeat TSH, FT4, thyroglobulin, thyroglobulin antibodies after 4 weeks  -Follow-up pathology results  -Patient might require WEBER treatment, however no need for low iodine diet on discharge, patient was briefly introduced to WEBER and needed precautions.        Plan communicated to ENT team over the phone.  Endocrinology pager [phone], secure chat 8 AM to 5 PM.  Patient seen examined and case discussed with Dr. Mehta who agrees on the management plan.

## 2023-11-23 NOTE — PROGRESS NOTES
"Amita Ponce is a 63 y.o. female on day 1 of admission presenting with Papillary thyroid carcinoma (CMS/HCC).     Subjective   No acute events overnight. PACU endocrine labs        Objective     Physical Exam  Objective:  Vitals reviewed  General: Alert, oriented, no acute distress  Resp: Breathing comfortably on room air, no stridor  Head: Atraumatic, normocephalic  Oral Cavity: MMM  Ears: normal external ears  Nose: external nose midline  Neck: Incision clean/dry/intact      Last Recorded Vitals  Blood pressure 110/71, pulse 74, temperature 37.5 °C (99.5 °F), temperature source Temporal, resp. rate 18, height 1.676 m (5' 6\"), weight 110 kg (242 lb 4.6 oz), SpO2 95 %.  Intake/Output last 3 Shifts:  I/O last 3 completed shifts:  In: 2348.8 (21.4 mL/kg) [I.V.:548.8 (5 mL/kg); IV Piggyback:1800]  Out: 1658.5 (15.1 mL/kg) [Urine:1350 (0.3 mL/kg/hr); Emesis/NG output:10; Drains:98.5; Blood:200]  Weight: 109.9 kg     Relevant Results        Scheduled medications  acetaminophen, 975 mg, oral, q8h TIANA   Or  acetaminophen, 1,000 mg, oral, q8h TIANA   Or  acetaminophen, 1,000 mg, g-tube, q8h TIANA  bacitracin, , Topical, TID  cholecalciferol, 3,000 Units, oral, Daily  citalopram, 20 mg, oral, Daily  heparin (porcine), 5,000 Units, subcutaneous, q8h  hydrogen peroxide, 1 Application, Topical, TID  pantoprazole, 40 mg, intravenous, Daily  polyethylene glycol, 17 g, oral, Daily  [START ON 11/24/2023] white petrolatum, 1 Application, Topical, TID      Continuous medications  HYDROmorphone,   lactated Ringer's, 75 mL/hr, Last Rate: 75 mL/hr (11/23/23 0511)      PRN medications  PRN medications: calcium carbonate, calcium carbonate, calcium gluconate, naloxone, naloxone, ondansetron **OR** ondansetron, oxygen, traMADol            This patient has a urinary catheter   Reason for the urinary catheter remaining today? Urine catheter unnecessary, will be removed today               Assessment/Plan   Principal Problem:    Papillary " thyroid carcinoma (CMS/HCC)    63 year old pmhx papillary thyroid cancer s/p total thyroidectomy, central neck dissection, mod right neck dissection II-V (sac IJ, CNXI), right sup parathyroid reimplantation with Dr. Dalton on 11/22.     Plan:  - Neuro: tylenol, tramadol for pain  - Resp: no resp distress, incentive spirometer  - GI: bowel regimen  - FEN: Monitor and replete electrolytes as needed, adv to CLD today  - : DC shelia 11/23, TOV  - Heme: monitor CBC  - ID: no indication for abx  - Drains: monitor output  - Endo: follow up endocrine recommendations  - Embolic PPx: SQH, SCDs while in bed  - Dispo: continued care on SCC5           Emanuel Cleaning MD

## 2023-11-24 LAB
ALBUMIN SERPL BCP-MCNC: 3.7 G/DL (ref 3.4–5)
ANION GAP SERPL CALC-SCNC: 12 MMOL/L (ref 10–20)
BUN SERPL-MCNC: 7 MG/DL (ref 6–23)
CALCIUM SERPL-MCNC: 8.7 MG/DL (ref 8.6–10.6)
CHLORIDE SERPL-SCNC: 105 MMOL/L (ref 98–107)
CO2 SERPL-SCNC: 29 MMOL/L (ref 21–32)
CREAT SERPL-MCNC: 0.67 MG/DL (ref 0.5–1.05)
GFR SERPL CREATININE-BSD FRML MDRD: >90 ML/MIN/1.73M*2
GLUCOSE SERPL-MCNC: 142 MG/DL (ref 74–99)
MAGNESIUM SERPL-MCNC: 2.07 MG/DL (ref 1.6–2.4)
PHOSPHATE SERPL-MCNC: 3.2 MG/DL (ref 2.5–4.9)
POTASSIUM SERPL-SCNC: 3.7 MMOL/L (ref 3.5–5.3)
SODIUM SERPL-SCNC: 142 MMOL/L (ref 136–145)

## 2023-11-24 PROCEDURE — 2500000001 HC RX 250 WO HCPCS SELF ADMINISTERED DRUGS (ALT 637 FOR MEDICARE OP): Performed by: STUDENT IN AN ORGANIZED HEALTH CARE EDUCATION/TRAINING PROGRAM

## 2023-11-24 PROCEDURE — 2500000004 HC RX 250 GENERAL PHARMACY W/ HCPCS (ALT 636 FOR OP/ED)

## 2023-11-24 PROCEDURE — 2500000005 HC RX 250 GENERAL PHARMACY W/O HCPCS

## 2023-11-24 PROCEDURE — 96372 THER/PROPH/DIAG INJ SC/IM: CPT | Performed by: STUDENT IN AN ORGANIZED HEALTH CARE EDUCATION/TRAINING PROGRAM

## 2023-11-24 PROCEDURE — 80069 RENAL FUNCTION PANEL: CPT | Performed by: STUDENT IN AN ORGANIZED HEALTH CARE EDUCATION/TRAINING PROGRAM

## 2023-11-24 PROCEDURE — 1200000002 HC GENERAL ROOM WITH TELEMETRY DAILY

## 2023-11-24 PROCEDURE — 2500000004 HC RX 250 GENERAL PHARMACY W/ HCPCS (ALT 636 FOR OP/ED): Performed by: STUDENT IN AN ORGANIZED HEALTH CARE EDUCATION/TRAINING PROGRAM

## 2023-11-24 PROCEDURE — 83735 ASSAY OF MAGNESIUM: CPT | Performed by: STUDENT IN AN ORGANIZED HEALTH CARE EDUCATION/TRAINING PROGRAM

## 2023-11-24 PROCEDURE — 36415 COLL VENOUS BLD VENIPUNCTURE: CPT | Performed by: STUDENT IN AN ORGANIZED HEALTH CARE EDUCATION/TRAINING PROGRAM

## 2023-11-24 PROCEDURE — C9113 INJ PANTOPRAZOLE SODIUM, VIA: HCPCS | Performed by: STUDENT IN AN ORGANIZED HEALTH CARE EDUCATION/TRAINING PROGRAM

## 2023-11-24 PROCEDURE — 2500000001 HC RX 250 WO HCPCS SELF ADMINISTERED DRUGS (ALT 637 FOR MEDICARE OP)

## 2023-11-24 RX ORDER — POTASSIUM CHLORIDE 1.5 G/1.58G
20 POWDER, FOR SOLUTION ORAL ONCE
Status: COMPLETED | OUTPATIENT
Start: 2023-11-24 | End: 2023-11-24

## 2023-11-24 RX ORDER — IBUPROFEN 600 MG/1
600 TABLET ORAL EVERY 6 HOURS PRN
Status: DISCONTINUED | OUTPATIENT
Start: 2023-11-24 | End: 2023-11-25 | Stop reason: HOSPADM

## 2023-11-24 RX ADMIN — IBUPROFEN 600 MG: 600 TABLET, FILM COATED ORAL at 09:17

## 2023-11-24 RX ADMIN — HYDROGEN PEROXIDE 1 APPLICATION: 30 LIQUID TOPICAL at 15:00

## 2023-11-24 RX ADMIN — ACETAMINOPHEN 975 MG: 325 TABLET ORAL at 21:33

## 2023-11-24 RX ADMIN — ACETAMINOPHEN 975 MG: 325 TABLET ORAL at 06:35

## 2023-11-24 RX ADMIN — HYDROGEN PEROXIDE 1 APPLICATION: 30 LIQUID TOPICAL at 09:00

## 2023-11-24 RX ADMIN — PANTOPRAZOLE SODIUM 40 MG: 40 INJECTION, POWDER, FOR SOLUTION INTRAVENOUS at 09:17

## 2023-11-24 RX ADMIN — PETROLATUM 1 APPLICATION: 1 OINTMENT TOPICAL at 21:00

## 2023-11-24 RX ADMIN — Medication 3000 UNITS: at 06:35

## 2023-11-24 RX ADMIN — ACETAMINOPHEN 975 MG: 325 TABLET ORAL at 13:16

## 2023-11-24 RX ADMIN — HYDROGEN PEROXIDE 1 APPLICATION: 30 LIQUID TOPICAL at 21:00

## 2023-11-24 RX ADMIN — BACITRACIN 1 APPLICATION: 500 OINTMENT TOPICAL at 15:00

## 2023-11-24 RX ADMIN — LEVOTHYROXINE SODIUM 125 MCG: 125 TABLET ORAL at 06:35

## 2023-11-24 RX ADMIN — POLYETHYLENE GLYCOL 3350 17 G: 17 POWDER, FOR SOLUTION ORAL at 09:18

## 2023-11-24 RX ADMIN — HEPARIN SODIUM 5000 UNITS: 5000 INJECTION INTRAVENOUS; SUBCUTANEOUS at 21:33

## 2023-11-24 RX ADMIN — POTASSIUM PHOSPHATE, MONOBASIC POTASSIUM PHOSPHATE, DIBASIC 21 MMOL: 224; 236 INJECTION, SOLUTION, CONCENTRATE INTRAVENOUS at 04:34

## 2023-11-24 RX ADMIN — BACITRACIN 1 APPLICATION: 500 OINTMENT TOPICAL at 09:17

## 2023-11-24 RX ADMIN — TRAMADOL HYDROCHLORIDE 50 MG: 50 TABLET, COATED ORAL at 04:41

## 2023-11-24 RX ADMIN — CITALOPRAM HYDROBROMIDE 20 MG: 20 TABLET ORAL at 09:17

## 2023-11-24 RX ADMIN — HEPARIN SODIUM 5000 UNITS: 5000 INJECTION INTRAVENOUS; SUBCUTANEOUS at 04:42

## 2023-11-24 RX ADMIN — HEPARIN SODIUM 5000 UNITS: 5000 INJECTION INTRAVENOUS; SUBCUTANEOUS at 13:17

## 2023-11-24 RX ADMIN — POTASSIUM CHLORIDE 20 MEQ: 1.5 POWDER, FOR SOLUTION ORAL at 13:17

## 2023-11-24 ASSESSMENT — COGNITIVE AND FUNCTIONAL STATUS - GENERAL
MOBILITY SCORE: 24
DAILY ACTIVITIY SCORE: 24

## 2023-11-24 ASSESSMENT — PAIN DESCRIPTION - LOCATION
LOCATION: HEAD
LOCATION: NECK

## 2023-11-24 ASSESSMENT — PAIN - FUNCTIONAL ASSESSMENT
PAIN_FUNCTIONAL_ASSESSMENT: 0-10
PAIN_FUNCTIONAL_ASSESSMENT: 0-10

## 2023-11-24 ASSESSMENT — PAIN SCALES - GENERAL
PAINLEVEL_OUTOF10: 6
PAINLEVEL_OUTOF10: 5 - MODERATE PAIN
PAINLEVEL_OUTOF10: 2

## 2023-11-24 NOTE — PROGRESS NOTES
"Amita Ponce is a 63 y.o. female on day 1 of admission presenting with Papillary thyroid carcinoma (CMS/HCC).         Subjective   No PO recorded, tolerating CLD. Complaining of headaches, ibuprofen ordered.               Objective   Physical Exam  Objective:  Vitals reviewed  General: Alert, oriented, no acute distress  Resp: Breathing comfortably on room air, no stridor  Head: Atraumatic, normocephalic  Oral Cavity: MMM  Ears: normal external ears  Nose: external nose midline  Neck: Incision clean/dry/intact        Last Recorded Vitals  Blood pressure 110/71, pulse 74, temperature 37.5 °C (99.5 °F), temperature source Temporal, resp. rate 18, height 1.676 m (5' 6\"), weight 110 kg (242 lb 4.6 oz), SpO2 95 %.  Intake/Output last 3 Shifts:  I/O last 3 completed shifts:  In: 2348.8 (21.4 mL/kg) [I.V.:548.8 (5 mL/kg); IV Piggyback:1800]  Out: 1658.5 (15.1 mL/kg) [Urine:1350 (0.3 mL/kg/hr); Emesis/NG output:10; Drains:98.5; Blood:200]  Weight: 109.9 kg      Relevant Results        Scheduled medications  acetaminophen, 975 mg, oral, q8h TIANA   Or  acetaminophen, 1,000 mg, oral, q8h TIANA   Or  acetaminophen, 1,000 mg, g-tube, q8h TIANA  bacitracin, , Topical, TID  cholecalciferol, 3,000 Units, oral, Daily  citalopram, 20 mg, oral, Daily  heparin (porcine), 5,000 Units, subcutaneous, q8h  hydrogen peroxide, 1 Application, Topical, TID  pantoprazole, 40 mg, intravenous, Daily  polyethylene glycol, 17 g, oral, Daily  [START ON 11/24/2023] white petrolatum, 1 Application, Topical, TID        Continuous medications  HYDROmorphone,   lactated Ringer's, 75 mL/hr, Last Rate: 75 mL/hr (11/23/23 0511)        PRN medications  PRN medications: calcium carbonate, calcium carbonate, calcium gluconate, naloxone, naloxone, ondansetron **OR** ondansetron, oxygen, traMADol              This patient has a urinary catheter              Reason for the urinary catheter remaining today? Urine catheter unnecessary, will be removed today            "         Assessment/Plan   Principal Problem:    Papillary thyroid carcinoma (CMS/HCC)     63 year old pmhx papillary thyroid cancer s/p total thyroidectomy, central neck dissection, mod right neck dissection II-V (sac IJ, CNXI), right sup parathyroid reimplantation with Dr. Dalton on 11/22.      Plan:  - Neuro: tylenol, tramadol for pain, +ibuprofen for HA  - Resp: no resp distress, incentive spirometer  - GI: bowel regimen  - FEN: Monitor and replete electrolytes as needed, adv to soft diet  - : DC bass 11/23, voiding pontaneously  - Heme: monitor CBC  - ID: no indication for abx  - Drains: monitor output  - Endo: follow up endocrine recommendations  - Embolic PPx: SQH, SCDs while in bed  - Dispo: continued care on SCC5         Patient seen and discussed with attending.      Sergio Zambrano MD

## 2023-11-24 NOTE — CARE PLAN
Problem: Pain  Goal: My pain/discomfort is manageable  Outcome: Progressing  Flowsheets (Taken 11/24/2023 1657)  Resident's pain/discomfort is manageable:   Include resident/family/caregiver in decisions related to pain management   Administer pain medication prior to activities that may trigger pain     Problem: Safety  Goal: Patient will be injury free during hospitalization  Outcome: Progressing  Goal: I will remain free of falls  Outcome: Progressing     Problem: Daily Care  Goal: Daily care needs are met  Outcome: Progressing  Flowsheets (Taken 11/24/2023 1657)  Daily care needs are met:   Include patient/family/caregiver in decisions related to daily care   Assess skin integrity/risk for skin breakdown     Problem: Psychosocial Needs  Goal: Demonstrates ability to cope with hospitalization/illness  Outcome: Progressing  Goal: Collaborate with me, my family, and caregiver to identify my specific goals  Outcome: Progressing     Problem: Discharge Barriers  Goal: My discharge needs are met  Outcome: Progressing   The patient's goals for the shift include      The clinical goals for the shift include No pain, No SS of Infection, OOB    Pt oob to chair mx times today  Pain well controlled today  No SS of infection

## 2023-11-25 VITALS
OXYGEN SATURATION: 93 % | TEMPERATURE: 97 F | HEART RATE: 62 BPM | BODY MASS INDEX: 38.94 KG/M2 | SYSTOLIC BLOOD PRESSURE: 135 MMHG | WEIGHT: 242.29 LBS | RESPIRATION RATE: 16 BRPM | HEIGHT: 66 IN | DIASTOLIC BLOOD PRESSURE: 78 MMHG

## 2023-11-25 LAB
ALBUMIN SERPL BCP-MCNC: 3.3 G/DL (ref 3.4–5)
ANION GAP SERPL CALC-SCNC: 11 MMOL/L (ref 10–20)
BILL ONLY-THYROGLOBULIN: NORMAL
BUN SERPL-MCNC: 8 MG/DL (ref 6–23)
CALCIUM SERPL-MCNC: 8.6 MG/DL (ref 8.6–10.6)
CHLORIDE SERPL-SCNC: 107 MMOL/L (ref 98–107)
CO2 SERPL-SCNC: 30 MMOL/L (ref 21–32)
CREAT SERPL-MCNC: 0.57 MG/DL (ref 0.5–1.05)
GFR SERPL CREATININE-BSD FRML MDRD: >90 ML/MIN/1.73M*2
GLUCOSE SERPL-MCNC: 84 MG/DL (ref 74–99)
MAGNESIUM SERPL-MCNC: 2.12 MG/DL (ref 1.6–2.4)
PHOSPHATE SERPL-MCNC: 3.1 MG/DL (ref 2.5–4.9)
POTASSIUM SERPL-SCNC: 3.7 MMOL/L (ref 3.5–5.3)
SODIUM SERPL-SCNC: 144 MMOL/L (ref 136–145)
THYROGLOB AB SERPL-ACNC: <0.9 IU/ML (ref 0–4)
THYROGLOB SERPL-MCNC: 78.2 NG/ML (ref 1.3–31.8)
THYROGLOB SERPL-MCNC: ABNORMAL NG/ML (ref 1.3–31.8)

## 2023-11-25 PROCEDURE — 83735 ASSAY OF MAGNESIUM: CPT | Performed by: STUDENT IN AN ORGANIZED HEALTH CARE EDUCATION/TRAINING PROGRAM

## 2023-11-25 PROCEDURE — 36415 COLL VENOUS BLD VENIPUNCTURE: CPT | Performed by: STUDENT IN AN ORGANIZED HEALTH CARE EDUCATION/TRAINING PROGRAM

## 2023-11-25 PROCEDURE — 2500000001 HC RX 250 WO HCPCS SELF ADMINISTERED DRUGS (ALT 637 FOR MEDICARE OP)

## 2023-11-25 PROCEDURE — C9113 INJ PANTOPRAZOLE SODIUM, VIA: HCPCS | Performed by: STUDENT IN AN ORGANIZED HEALTH CARE EDUCATION/TRAINING PROGRAM

## 2023-11-25 PROCEDURE — 2500000004 HC RX 250 GENERAL PHARMACY W/ HCPCS (ALT 636 FOR OP/ED): Performed by: STUDENT IN AN ORGANIZED HEALTH CARE EDUCATION/TRAINING PROGRAM

## 2023-11-25 PROCEDURE — 80069 RENAL FUNCTION PANEL: CPT | Performed by: STUDENT IN AN ORGANIZED HEALTH CARE EDUCATION/TRAINING PROGRAM

## 2023-11-25 PROCEDURE — 2500000001 HC RX 250 WO HCPCS SELF ADMINISTERED DRUGS (ALT 637 FOR MEDICARE OP): Performed by: STUDENT IN AN ORGANIZED HEALTH CARE EDUCATION/TRAINING PROGRAM

## 2023-11-25 PROCEDURE — 96372 THER/PROPH/DIAG INJ SC/IM: CPT | Performed by: STUDENT IN AN ORGANIZED HEALTH CARE EDUCATION/TRAINING PROGRAM

## 2023-11-25 RX ORDER — TRAMADOL HYDROCHLORIDE 50 MG/1
50 TABLET ORAL EVERY 6 HOURS PRN
Qty: 12 TABLET | Refills: 0 | Status: SHIPPED | OUTPATIENT
Start: 2023-11-25 | End: 2023-12-04 | Stop reason: ALTCHOICE

## 2023-11-25 RX ORDER — TRAMADOL HYDROCHLORIDE 50 MG/1
50 TABLET ORAL EVERY 6 HOURS PRN
Qty: 15 TABLET | Refills: 0 | Status: SHIPPED | OUTPATIENT
Start: 2023-11-25 | End: 2024-03-04 | Stop reason: ALTCHOICE

## 2023-11-25 RX ORDER — LEVOTHYROXINE SODIUM 125 UG/1
125 TABLET ORAL DAILY
Qty: 30 TABLET | Refills: 2 | Status: SHIPPED | OUTPATIENT
Start: 2023-11-26 | End: 2024-03-04 | Stop reason: SDUPTHER

## 2023-11-25 RX ADMIN — HEPARIN SODIUM 5000 UNITS: 5000 INJECTION INTRAVENOUS; SUBCUTANEOUS at 05:36

## 2023-11-25 RX ADMIN — TRAMADOL HYDROCHLORIDE 50 MG: 50 TABLET, COATED ORAL at 13:23

## 2023-11-25 RX ADMIN — PANTOPRAZOLE SODIUM 40 MG: 40 INJECTION, POWDER, FOR SOLUTION INTRAVENOUS at 09:11

## 2023-11-25 RX ADMIN — HYDROGEN PEROXIDE 1 APPLICATION: 30 LIQUID TOPICAL at 09:00

## 2023-11-25 RX ADMIN — CITALOPRAM HYDROBROMIDE 20 MG: 20 TABLET ORAL at 09:11

## 2023-11-25 RX ADMIN — ACETAMINOPHEN 975 MG: 325 TABLET ORAL at 05:36

## 2023-11-25 RX ADMIN — PETROLATUM 1 APPLICATION: 1 OINTMENT TOPICAL at 09:00

## 2023-11-25 RX ADMIN — Medication 3000 UNITS: at 07:44

## 2023-11-25 RX ADMIN — ACETAMINOPHEN 975 MG: 325 TABLET ORAL at 14:05

## 2023-11-25 RX ADMIN — LEVOTHYROXINE SODIUM 125 MCG: 125 TABLET ORAL at 05:36

## 2023-11-25 RX ADMIN — IBUPROFEN 600 MG: 600 TABLET, FILM COATED ORAL at 07:48

## 2023-11-25 RX ADMIN — IBUPROFEN 600 MG: 600 TABLET, FILM COATED ORAL at 01:38

## 2023-11-25 ASSESSMENT — COGNITIVE AND FUNCTIONAL STATUS - GENERAL: MOBILITY SCORE: 24

## 2023-11-25 ASSESSMENT — PAIN - FUNCTIONAL ASSESSMENT
PAIN_FUNCTIONAL_ASSESSMENT: 0-10

## 2023-11-25 ASSESSMENT — PAIN DESCRIPTION - LOCATION
LOCATION: NECK
LOCATION: NECK
LOCATION: HEAD

## 2023-11-25 ASSESSMENT — PAIN SCALES - GENERAL
PAINLEVEL_OUTOF10: 2
PAINLEVEL_OUTOF10: 2
PAINLEVEL_OUTOF10: 6
PAINLEVEL_OUTOF10: 3
PAINLEVEL_OUTOF10: 5 - MODERATE PAIN

## 2023-11-25 NOTE — DISCHARGE INSTRUCTIONS
[facility] DEPARTMENT OF OTOLARYNGOLOGY (ENT):  THYROIDECTOMY/PARATHYROIDECTOMY POST-OPERATIVE INSTRUCTIONS    Operations performed while hospitalized:   Thyroidectomy (total)  Central Neck Dissection  Modified right neck dissection (levels II-V) with sacrifice for the R internal jugular and R cranial nerve 11  Right superior parathyroid reimplantation    Treatment/wound care:   Keep area(s) clean and dry.   It is okay to shower 48 hours after time of surgery.    Do not scrub wound(s), pat dry.    Do not submerge wound(s) in standing water until seen in followup (no tub bathing, swimming, or hot tubs).    Please visually inspect your wound(s) at least once daily.  If the wound(s) are in a difficult to see location, please use a mirror or have someone else assist with visual inspection.    If you have sutures that you can see outside of the skin or staples:  Please have staples/sutures removed in our clinic 10-14 days after the date of surgery.  Do not remove the staples/sutures on your own.  Return sooner or call if wound(s) or surrounding area have increased swelling, pain, warmth, redness, or drainage that is thick, yellow and/or green.    If you see white bandages on your neck:  You have steri strips (small paper tape) along your incision. You can shower, pat dry; do not soak in a tub.  The steri strips will fall off on their own; do not peel them off.    Expected Post-Surgical Symptoms:  You may experience neck pain and a hoarse/weak voice. These symptoms are often temporary and may be due to irritation from the breathing tube that's inserted during surgery.  Swallowing difficulties due to pain for several days.    Pain Control:    Adequate management:   Tramadol can be taken as prescribed as needed for breakthrough pain.      Activity after Discharge:   When you go home, you can usually return to your regular activities.  Avoid strenuous activities, such as bicycle riding, jogging, weight lifting, or  aerobic exercise, for at least 2 weeks.   No travelling for at least 7 days following surgery.  Do not drive or operate heavy machinery while taking narcotic pain medications as these medications can alter perception, impair judgement, and slow reaction times.    Diet: resume normal diet    Additional Instructions:   Medicines  DO NOT take blood thinners, such as warfarin (Coumadin), clopidogrel (Plavix), or aspirin until instructed to do so from your surgeon.   Do not take aspirin, medicines that contain aspirin, or non-steroidal anti-inflammatory medicines such as ibuprofen (Advil, Motrin) or naproxen (Aleve) for 2 weeks after surgery unless otherwise directed by your doctor.  Take pain medicines exactly as directed.       If you have a total thyroidectomy your body will no longer be able to make thyroid hormone, and without a replacement you'll develop signs and symptoms of an underactive thyroid (hypothyroidism). Therefore, you will need to take a pill every day that contains the synthetic thyroid hormone levothyroxine.     Take your levothyroxine/Synthroid 30 minutes before food or coffee.  Wait 30 minutes after taking levothyroxine to take Proton Pump Inhibitors, examples of these medications are Protonix, Prilosec, Nexium, and others.    Wait two hours after taking levothyroxine to take vitamins calcium iron.   If you forget to take your levothyroxine, double the dose the next day.    You may also have a prescription for calcium supplementation, it is important to take this exactly as instructed.    Signs & Symptoms of Low Calcium:  Tingling in your hands, feet, or lips   Muscle spasms, weakness, shaking, or loss of body control   Seizures   Slow or uneven heartbeat, lightheadedness   Anxiety, depression, anger, confusion, or seeing or hearing things that are not really there     If you experience any of these symptoms please take 2 extra strength Tums and contact your surgeon's office for further  instructions.    RAPHAEL Drain  You have a bulb drain in place that you will be sent home with. To empty the drain, open cap and the top and tip into cup to empty. Squeeze drain then replace the cap.   Please empty the drain and record its output  daily and bring these numbers to your follow up appointment.  This drain is sutured into place. Please keep it dry and change the dressing sponge around the drain daily or it get soiled.     Follow up in clinic as scheduled. Call to confirm your appointment as soon as possible. Call [phone].    Additional instructions: If you have fever greater than 101F, develop chills, nausea/vomiting, shortness of breath, have increasing swelling or redness around the incision, have increasing pain not controlled on oral pain medications, or have pus-like drainage from the incision, call the surgery office or go to the ED.    Dr. Dalton's nurse, Chloe Castañeda, can be reached at [phone]

## 2023-11-25 NOTE — CARE PLAN
The clinical goals for the shift include remain safe prior to discharge with adequate pain control    Patient with stable VS. Took ibuprofen and tramadol for pain this shift for headaches. Up ad dl. ENT removed 1 drain prior to discharge. No numbness or tingling. RN taught patient and spouse all drain care, recording, emptying, stripping. Gave log and supplies. Peripheral IV removed.. Cath tip intact. RN reviewed dc instructions with both. Transport took patient downstairs in wheelchair to husbands car. Patient safely discharged. Neena Ledezma Rn

## 2023-11-25 NOTE — DISCHARGE SUMMARY
Discharge Diagnosis  Papillary thyroid carcinoma (CMS/HCC)  Cervical lymph node metastasis    Issues Requiring Follow-Up  Post op thyroidectomy, neck dissection, drain in place    Test Results Pending At Discharge  Pending Labs       Order Current Status    Surgical Pathology Exam In process    Thyroglobulin and Antithyroglobulin In process            Hospital Course  .Amita Ponce is a 63 y.o. female with papillary thyroid cancer, who underwent  total thyroidectomy, central neck dissection, mod right neck dissection II-V (sac IJ, CNXI), right sup parathyroid reimplantation by Dr. Dalton. Patient had an uncomplicated surgical course. Patient recovered in PACU and was transferred to [place] [address] for post-operative care.    Patient post-operative course was uncomplicated. IV pain medications were transitioned to enteral medications, bass was removed. Home medications were restarted. Surgical drains were monitored until output had decreased and were removed; however, patient was sent home with one surgical drain and instructions for follow up to have the drain removed.     On day of discharge, post-operative pain was well controlled with enteral pain medication, breathing on room air, voiding spontaneously ambulating well, and was tolerating a diet. Follow-up arranged.       Pertinent Physical Exam At Time of Discharge  Physical Exam  Physical Exam  Objective:  Vitals reviewed  General: Alert, oriented, no acute distress  Resp: Breathing comfortably on room air, no stridor  Head: Atraumatic, normocephalic  Oral Cavity: MMM  Ears: normal external ears  Nose: external nose midline  Neck: Incision clean/dry/intact      Home Medications     Medication List      START taking these medications     levothyroxine 125 mcg tablet; Commonly known as: Synthroid, Levoxyl;   Take 1 tablet (125 mcg) by mouth early in the morning.. Do not start   before November 26, 2023.; Start taking on: November 26, 2023     CHANGE how you take  these medications     * traMADol 50 mg tablet; Commonly known as: Ultram; Take 1 tablet (50   mg) by mouth every 6 hours if needed for severe pain (7 - 10) for up to 3   days.; What changed: Another medication with the same name was added. Make   sure you understand how and when to take each.   * traMADol 50 mg tablet; Commonly known as: Ultram; Take 1 tablet (50   mg) by mouth every 6 hours if needed for moderate pain (4 - 6).; What   changed: You were already taking a medication with the same name, and this   prescription was added. Make sure you understand how and when to take   each.  * This list has 2 medication(s) that are the same as other medications   prescribed for you. Read the directions carefully, and ask your doctor or   other care provider to review them with you.     CONTINUE taking these medications     cetirizine 10 mg tablet; Commonly known as: ZyrTEC   cholecalciferol 25 MCG (1000 UT) tablet; Commonly known as: Vitamin D-3   citalopram 20 mg tablet; Commonly known as: CeleXA   montelukast 10 mg tablet; Commonly known as: Singulair   VITAMIN B COMPLEX ORAL       Outpatient Follow-Up  Future Appointments   Date Time Provider Department Center   12/4/2023 11:15 AM Vandana Dalton MD [id number]       Emanuel Cleaning MD

## 2023-11-27 NOTE — ANESTHESIA POSTPROCEDURE EVALUATION
Patient: Amita Ponce    Procedure Summary       Date: 11/22/23 Room / Location: [facility] OR 03 / [facility]    Anesthesia Start: 1049 Anesthesia Stop: 1826    Procedures:       Thyroidectomy (Bilateral)      Dissection Neck (Right) Diagnosis:       Papillary thyroid carcinoma (CMS/HCC)      (Papillary thyroid carcinoma (CMS/HCC) [C73])    Surgeons: Vandana Dalton MD Responsible Provider: Ugo Hagan MD    Anesthesia Type: general ASA Status: 2            Anesthesia Type: general    Vitals Value Taken Time   /78 11/25/23 1312   Temp 36.1 °C (97 °F) 11/25/23 1312   Pulse 62 11/25/23 1312   Resp 16 11/25/23 1312   SpO2 93 % 11/25/23 1312       Anesthesia Post Evaluation    Patient location during evaluation: PACU  Patient participation: complete - patient participated  Level of consciousness: awake  Pain management: adequate  Multimodal analgesia pain management approach  Airway patency: patent  Cardiovascular status: acceptable  Respiratory status: acceptable  Hydration status: acceptable  Postoperative Nausea and Vomiting: none        No notable events documented.

## 2023-11-28 ENCOUNTER — TELEPHONE (OUTPATIENT)
Dept: OPERATING ROOM | Facility: HOSPITAL | Age: 63
End: 2023-11-28
Payer: COMMERCIAL

## 2023-11-28 NOTE — TELEPHONE ENCOUNTER
----- Message from Jen Taylor sent at 11/28/2023 11:03 AM EST -----  Regarding: call back  Patient would like a call back said her neck is feeling like a lot of pulling and discomfort would like to speak with you regarding this.

## 2023-11-28 NOTE — TELEPHONE ENCOUNTER
Spoke to patient. She states she is having a lot of tightness on her neck. She just wanted to make sure that was normal. Patient states that incision is not red or swollen, and drainage from her drain she was sent home with is serous in color. I let her know that it is normal for that area to be a little tight post surgery while it heals. I reassured her that it did not sound like she had any infection. She also states she was told 2 different things regarding her drain. One person told her that she should call our office to see when we wanted it out, while the other told her to wait till her follow up this Monday. It has been putting out about 28 ml per day. I let her know that we usually removed it when it has put out less than 30 mls a day. She states that there is no way she would be able to get a ride up to have it removed anyway until 12/4 when she sees Dr. Dalton. I told her I would pass this on to Dr. Dalton and call her if we needed to see her before 12/4.

## 2023-11-29 ENCOUNTER — OFFICE VISIT (OUTPATIENT)
Dept: OTOLARYNGOLOGY | Facility: HOSPITAL | Age: 63
End: 2023-11-29
Payer: COMMERCIAL

## 2023-11-29 VITALS — HEIGHT: 66 IN | TEMPERATURE: 97.7 F | BODY MASS INDEX: 38.41 KG/M2 | WEIGHT: 239 LBS

## 2023-11-29 DIAGNOSIS — C73 PAPILLARY THYROID CARCINOMA (MULTI): Primary | ICD-10-CM

## 2023-11-29 PROCEDURE — 1036F TOBACCO NON-USER: CPT | Performed by: OTOLARYNGOLOGY

## 2023-11-29 PROCEDURE — 99024 POSTOP FOLLOW-UP VISIT: CPT | Performed by: OTOLARYNGOLOGY

## 2023-11-29 ASSESSMENT — ENCOUNTER SYMPTOMS
DIZZINESS: 0
STRIDOR: 0
NECK PAIN: 1
FATIGUE: 1
CHOKING: 0

## 2023-11-29 NOTE — PROGRESS NOTES
HPI  Amita Hoang a 63 y.o. female here for her 1st post op visit s/p total thyroidectomy, right radical neck dissection 11/22/23.  She was discharged home with a drain in place.  Here for drain removal.  Mild voice changes, slightly deeper voice but still strong overall.  Swallowing soft diet.  Fully independent.  Great neck FOM and able to lift her arm fully up over her head.    History:  Dx: Papillary thyroid carcinoma  Dx: CLL  1/23: Noticed a small nodule in her right neck, inc size   9/23: CT neck +new cystic mass 5.2x4.1x5.6cm w/n +enhancing nodule 1.2 x 0.8 x 1.3 cm, abuts or arises from the right thyroid lobe. The thyroid gland is diffusely heterogeneous, right greater than left but can't see a discrete nodule.  There is an additional cystic appearing node at level 2 on the right measuring 1.5 x 0.8 cm.   10/20/23: FNA right thyroid mass +classic papillary thyroid carcinoma  11/22/23: S/p total thyroidectomy and right radical neck dissection - path pending    ROS  Review of Systems   Constitutional:  Positive for fatigue.   Respiratory:  Negative for choking and stridor.    Musculoskeletal:  Positive for neck pain.   Neurological:  Negative for dizziness.        Deeper voice        PE  PHYSICAL EXAMINATION:  Constitutional:  No acute distress  Voice:  Mild hoarseness but strong, not breathy  Respiration:  Breathing comfortably, no stridor  Cardiovascular:  No clubbing/cyanosis/edema in hands  Eyes:  sclera normal  Neuro:  Alert and oriented times 3, Cranial nerves II-XII grossly intact and symmetric bilaterally - domenica right CNXI with great arm raise/shoulder shrug  Head and Face:  Symmetric facial features, no masses or lesions,  Nose:  External nose midline  Oral Cavity/Oropharynx/Lips:  MMM  Neck/Lymph:  Incision C/D/I, healing well, no seroma/hematoma, drain removed from midline neck  Psych:  Alert and oriented with appropriate mood and affect    ASSESSMENT AND PLAN  Problem List Items Addressed This  Visit       Papillary thyroid carcinoma (CMS/HCC) - Primary    Current Assessment & Plan     Good early result  Voice returning as expected  Drain removed  Reassurance provided  Follow up next week as scheduled             Vandana Dalton MD    Head & Neck Surgical Oncology & Reconstruction  Department of Otolaryngology - Head and Neck Surgery      By signing my name below, I, Jensen Brennan, attest that this documentation has been prepared under the direction and in the presence of Dr. Vandana Dalton MD.     All medical record entries made by the Scribe were at my direction and personally dictated by me, Dr. Vandana Dalton. I have reviewed the chart and agree that the record accurately reflects my personal performance of the history, physical exam, discussion and plan.     Procedures  ENT Physical Exam

## 2023-12-01 ASSESSMENT — ENCOUNTER SYMPTOMS
NECK PAIN: 1
STRIDOR: 0
FATIGUE: 1
DIZZINESS: 0

## 2023-12-01 NOTE — PROGRESS NOTES
HPI  Amita Ponce is a 63 y.o. female here for her 2nd post op visit s/p thyroidectomy and neck dissection on 11/22/23. Path is still pending. Last seen 11/29/23 when she had her drain pulled. She reports some neck pain pain and stiffness.  She is more active and is tolerating more regular diet although she has noticed some discomfort down into her right chest (by her right pec muscle).  Voice back to normal.    History:  Dx: Papillary thyroid carcinoma  Dx: CLL  1/23: Noticed a small nodule in her right neck, inc size   9/23: CT neck +new cystic mass 5.2x4.1x5.6cm w/n +enhancing nodule 1.2 x 0.8 x 1.3 cm, abuts or arises from the right thyroid lobe. The thyroid gland is diffusely heterogeneous, right greater than left but can't see a discrete nodule.  There is an additional cystic appearing node at level 2 on the right measuring 1.5 x 0.8 cm.   10/20/23: FNA right thyroid mass +classic papillary thyroid carcinoma  11/22/23: S/p total thyroidectomy and right radical neck dissection - path pending  11/29/23: Drain removed    ROS  Review of Systems   Constitutional:  Positive for fatigue. Negative for appetite change, chills and fever.   HENT:  Positive for voice change. Negative for trouble swallowing.    Respiratory:  Negative for choking and stridor.    Musculoskeletal:  Positive for neck pain. Negative for neck stiffness.   Neurological:  Negative for dizziness.        Deeper voice   All other systems reviewed and are negative.       PE  PHYSICAL EXAMINATION:  Constitutional:  No acute distress  Voice:  No hoarseness or other abnormality  Respiration:  Breathing comfortably, no stridor  Cardiovascular:  No clubbing/cyanosis/edema in hands  Eyes:  sclera normal  Neuro:  Alert and oriented times 3, Cranial nerves II-XII grossly intact and symmetric bilaterally  Head and Face:  Symmetric facial features, no masses or lesions,  Nose:  External nose midline  Oral Cavity/Oropharynx/Lips:  MMM  Neck/Lymph:  Incision  C/D/I, healing well.  No seroma/hematoma, deformity associated with radical neck as expected.  Psych:  Alert and oriented with appropriate mood and affect    ASSESSMENT AND PLAN  Problem List Items Addressed This Visit       Neck mass    Current Assessment & Plan     Now resolved after surgery         Papillary thyroid carcinoma (CMS/HCC)    Current Assessment & Plan     Doing well as expected after surgery  Reassurance provided.  Education provided regarding incision care  Path pending  Education provided regarding WEBER which will be managed via endocrinology  Referral to endocrinology, she was seen while in the hospital and will refer for outpatient appointment  She will continue daily synthroid, follow up TSH and TG in 1 month         Acquired hypothyroidism - Primary    Current Assessment & Plan     Continue daily synthroid, will need TSH/TG in 1 month  Referral to endocrinology, was seen in house as well          Vandana Dalton MD    Head & Neck Surgical Oncology & Reconstruction  Department of Otolaryngology - Head and Neck Surgery        By signing my name below, I, Snehal Brennanibe, attest that this documentation has been prepared under the direction and in the presence of Dr. Vandana Dalton MD.     All medical record entries made by the Scribe were at my direction and personally dictated by me, Dr. Vandana Dalton. I have reviewed the chart and agree that the record accurately reflects my personal performance of the history, physical exam, discussion and plan.

## 2023-12-04 ENCOUNTER — OFFICE VISIT (OUTPATIENT)
Dept: OTOLARYNGOLOGY | Facility: CLINIC | Age: 63
End: 2023-12-04
Payer: COMMERCIAL

## 2023-12-04 VITALS — BODY MASS INDEX: 39.36 KG/M2 | WEIGHT: 240.2 LBS

## 2023-12-04 DIAGNOSIS — E03.9 ACQUIRED HYPOTHYROIDISM: Primary | ICD-10-CM

## 2023-12-04 DIAGNOSIS — C73 PAPILLARY THYROID CARCINOMA (MULTI): ICD-10-CM

## 2023-12-04 DIAGNOSIS — R22.1 NECK MASS: ICD-10-CM

## 2023-12-04 PROCEDURE — 1036F TOBACCO NON-USER: CPT | Performed by: OTOLARYNGOLOGY

## 2023-12-04 PROCEDURE — 99024 POSTOP FOLLOW-UP VISIT: CPT | Performed by: OTOLARYNGOLOGY

## 2023-12-04 ASSESSMENT — ENCOUNTER SYMPTOMS
APPETITE CHANGE: 0
VOICE CHANGE: 1
TROUBLE SWALLOWING: 0
CHILLS: 0
CHOKING: 0
DEPRESSION: 0
NECK STIFFNESS: 0
FEVER: 0

## 2023-12-04 NOTE — ASSESSMENT & PLAN NOTE
Continue daily synthroid, will need TSH/TG in 1 month  Referral to endocrinology, was seen in house as well

## 2023-12-04 NOTE — PATIENT INSTRUCTIONS
Dr. Dalton evaluated you today.    Your care plan is outlined below:  -- We will get you in with endocrinology.   -- Follow up with Dr. Dalton in 3 mo.  This appointment was scheduled at the end of your visit today.  If you need to reschedule, please call the office at [phone].  Please keep in mind that last minute cancellations often result in delayed follow-up appointments.     General appointment line please call [phone]  For general questions or scheduling issues please call [phone] option #2   For medical questions or surgery scheduling please call [phone] on Mondays, Wednesdays and Thursdays or [phone] on Tuesdays and Fridays. Please be sure to leave a voice mail or your call will not be able to be returned.     Dr. Dalton makes every effort to run on time for your appointments.  Therefore, if you are more than 30 minutes late for your appointment, unrelated to a scan or another appointment such as chemotherapy or radiation, your appointment will need to be rescheduled to another day.  We appreciate your understanding.

## 2023-12-04 NOTE — ASSESSMENT & PLAN NOTE
Doing well as expected after surgery  Reassurance provided.  Education provided regarding incision care  Path pending  Education provided regarding WEBER which will be managed via endocrinology  Referral to endocrinology, she was seen while in the hospital and will refer for outpatient appointment  She will continue daily synthroid, follow up TSH and TG in 1 month

## 2023-12-04 NOTE — ASSESSMENT & PLAN NOTE
Good early result  Voice returning as expected  Drain removed  Reassurance provided  Follow up next week as scheduled

## 2023-12-05 ENCOUNTER — TELEPHONE (OUTPATIENT)
Dept: ENDOCRINOLOGY | Facility: CLINIC | Age: 63
End: 2023-12-05
Payer: COMMERCIAL

## 2023-12-05 NOTE — TELEPHONE ENCOUNTER
Per Dr. Evans patient is to be scheduled 12/19-12/21 follow up for extensive metastatic PTC, left voicemail to call back

## 2023-12-07 DIAGNOSIS — C73 PAPILLARY THYROID CARCINOMA (MULTI): ICD-10-CM

## 2023-12-15 ENCOUNTER — LAB (OUTPATIENT)
Dept: LAB | Facility: LAB | Age: 63
End: 2023-12-15
Payer: COMMERCIAL

## 2023-12-15 ENCOUNTER — APPOINTMENT (OUTPATIENT)
Dept: LAB | Facility: LAB | Age: 63
End: 2023-12-15
Payer: COMMERCIAL

## 2023-12-15 DIAGNOSIS — C73 PAPILLARY THYROID CARCINOMA (MULTI): ICD-10-CM

## 2023-12-15 LAB — TSH SERPL-ACNC: 0.21 MIU/L (ref 0.44–3.98)

## 2023-12-15 PROCEDURE — 86800 THYROGLOBULIN ANTIBODY: CPT

## 2023-12-15 PROCEDURE — 36415 COLL VENOUS BLD VENIPUNCTURE: CPT

## 2023-12-15 PROCEDURE — 84432 ASSAY OF THYROGLOBULIN: CPT

## 2023-12-15 PROCEDURE — 84443 ASSAY THYROID STIM HORMONE: CPT

## 2023-12-17 LAB
BILL ONLY-THYROGLOBULIN: NORMAL
THYROGLOB AB SERPL-ACNC: <0.9 IU/ML (ref 0–4)
THYROGLOB SERPL-MCNC: 0.1 NG/ML (ref 1.3–31.8)
THYROGLOB SERPL-MCNC: ABNORMAL NG/ML (ref 1.3–31.8)

## 2023-12-19 LAB
LAB AP ASR DISCLAIMER: NORMAL
LABORATORY COMMENT REPORT: NORMAL
Lab: NORMAL
PATH REPORT.COMMENTS IMP SPEC: NORMAL
PATH REPORT.FINAL DX SPEC: NORMAL
PATH REPORT.GROSS SPEC: NORMAL
PATH REPORT.RELEVANT HX SPEC: NORMAL
PATH REPORT.TOTAL CANCER: NORMAL
PATHOLOGY SYNOPTIC REPORT: NORMAL

## 2023-12-19 PROCEDURE — 88313 SPECIAL STAINS GROUP 2: CPT | Performed by: PATHOLOGY

## 2023-12-19 PROCEDURE — 88341 IMHCHEM/IMCYTCHM EA ADD ANTB: CPT | Performed by: PATHOLOGY

## 2023-12-19 PROCEDURE — 88342 IMHCHEM/IMCYTCHM 1ST ANTB: CPT | Performed by: PATHOLOGY

## 2023-12-20 ENCOUNTER — APPOINTMENT (OUTPATIENT)
Dept: OTOLARYNGOLOGY | Facility: HOSPITAL | Age: 63
End: 2023-12-20
Payer: COMMERCIAL

## 2023-12-20 NOTE — PROGRESS NOTES
Subjective   Ms. Amita Ponce is a 63-year-old female who I am asked to see for consultation as she was found to have metastatic papillary thyroid cancer.  The referral has been made by Dr. Dalton.    In January 2023, she noticed a small nodule in her right neck which increased in size.  CT scan of the neck was obtained in September 2023 where she was found to have a new cystic mass measuring 5.2 x 4.1 x 5.6cm within an enhancing nodule 1.2 x 0.8 x 1.3 cm, abuts or arising from the right thyroid lobe. The thyroid gland is diffusely heterogeneous, right greater than left but can't see a discrete nodule.  There is an additional cystic appearing node at level 2 on the right measuring 1.5 x 0.8 cm.     She underwent a FNAB of the right mass on October 20, 2023.  Cytology revealed classic papillary thyroid carcinoma.    She underwent a total thyroidectomy and right radical neck dissection on November 22, 2023.      Surgical pathology revealed:  A. No pathological diagnosis: Parathyroid (right inferior) biopsy     B. 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 Classic papillary carcinoma, 6.5 cm, with extensive hemorrhage and extrathyroidal extension;   underlying follicular nodular disease Thyroid       Metastatic papillary thyroid carcinoma: Lymph nodes, 5 of 21             - right hemithyroidectomy and neck dissection specimen     D. Metastatic papillary thyroid carcinoma, largest 0.3 cm:   Lymph nodes, 4 of 12 (central neck) dissection specimen     E. Papillary carcinoma, 1.0 cm, and follicular nodular disease:   Thyroid, left completion thyroidectomy specimen    Current Regimen  Levothyroxine 125mcg po daily     Symptoms are as listed below:  Energy levels -  good   Sleep -  uses CPAP   Palpitations - denies   Tremors -denies   Weight changes -  gained     Objective   /70 (BP Location: Left arm, Patient Position: Sitting, BP Cuff  "Size: Adult)   Pulse 85   Ht 1.651 m (5' 5\")   Wt 110 kg (242 lb)   BMI 40.27 kg/m²    Physical Exam  Vitals and nursing note reviewed.   Constitutional:       General: She is not in acute distress.     Appearance: Normal appearance. She is obese.   HENT:      Head: Normocephalic and atraumatic.      Nose: Nose normal.      Mouth/Throat:      Mouth: Mucous membranes are moist.   Eyes:      Extraocular Movements: Extraocular movements intact.   Neck:      Comments: Healed thyroidectomy scar   Pulmonary:      Effort: Pulmonary effort is normal.   Musculoskeletal:         General: Normal range of motion.   Neurological:      Mental Status: She is alert and oriented to person, place, and time.   Psychiatric:         Mood and Affect: Mood normal.         Lab Results   Component Value Date    TSH 0.21 (L) 12/15/2023     === 10/20/23 ===    US GUIDED THYROID BIOPSY    - Impression -  Technically successful ultrasound-guided fine-needle aspiration, core  biopsy as well as fluid aspiration of a solid and cystic thyroid  nodule in the right lobe of the thyroid gland.    Signed by: Tara Peralta 10/21/2023 11:23 AM  Dictation workstation:   [id number]      Assessment/Plan   63-year-old female presents for the evaluation of for the management of metastatic papillary thyroid cancer.  She has a history of CLL also.    Papillary thyroid carcinoma (CMS/HCC)  For thyroid hormone withdrawal as such:  Start a low iodine diet on January 15, 2023  Stop Levothyroxine on January 15, 2023  For blood tests to determine treatment on January 22, 2023  To be treated with WEBER, ~100mCi  Counseled regarding the contact precautions needed following WEBER  To resume Levothyroxine 125mcg po daily once treatment is complete  Take levothyroxine on an empty stomach with water alone, 30-60 minutes before eating or taking other medications, 4 hours before any calcium or iron supplement  To resume a normal diet once radioactive iodine treatment is " complete  For a whole body scan 5-7 days post treatment     For follow up 6 weeks after radioactive iodine therapy      Postoperative hypothyroidism  To continue Levothyroxine 125mcg po daily once treatment is complete  Take levothyroxine on an empty stomach with water alone, 30-60 minutes before eating or taking other medications, 4 hours before any calcium or iron supplement

## 2023-12-20 NOTE — PATIENT INSTRUCTIONS
Thank you for choosing [facility]  for your health care needs.  If you have any questions, concerns or medical needs, please feel free to contact our office at [phone].    Please ensure you complete your blood work one week before the next scheduled appointment.    To use Vitamin E on the scar  For thyroid hormone withdrawal as such:  Start a low iodine diet on January 15, 2023  Stop Levothyroxine on January 15, 2023  For blood tests and treatment on January 22, 2023  To be treated with WEBER, ~100mCi  To resume Levothyroxine 125mcg po daily once treatment is complete  Take levothyroxine on an empty stomach with water alone, 30-60 minutes before eating or taking other medications, 4 hours before any calcium or iron supplement  To resume a normal diet once radioactive iodine treatment is complete  For a whole body scan 5-7 days post treatment   For follow up 6 weeks after radioactive iodine therapy

## 2023-12-21 ENCOUNTER — TELEPHONE (OUTPATIENT)
Dept: OTOLARYNGOLOGY | Facility: HOSPITAL | Age: 63
End: 2023-12-21

## 2023-12-21 ENCOUNTER — OFFICE VISIT (OUTPATIENT)
Dept: ENDOCRINOLOGY | Facility: CLINIC | Age: 63
End: 2023-12-21
Payer: COMMERCIAL

## 2023-12-21 VITALS
SYSTOLIC BLOOD PRESSURE: 126 MMHG | BODY MASS INDEX: 40.32 KG/M2 | HEIGHT: 65 IN | DIASTOLIC BLOOD PRESSURE: 70 MMHG | WEIGHT: 242 LBS | HEART RATE: 85 BPM

## 2023-12-21 DIAGNOSIS — C73 PAPILLARY THYROID CARCINOMA (MULTI): ICD-10-CM

## 2023-12-21 PROCEDURE — 1036F TOBACCO NON-USER: CPT | Performed by: INTERNAL MEDICINE

## 2023-12-21 PROCEDURE — 99204 OFFICE O/P NEW MOD 45 MIN: CPT | Performed by: INTERNAL MEDICINE

## 2023-12-21 RX ORDER — ACETAMINOPHEN 500 MG
TABLET ORAL DAILY
COMMUNITY

## 2023-12-21 NOTE — TELEPHONE ENCOUNTER
Called no answer. Left VM.    Vandana Dalton MD    Head & Neck Surgical Oncology & Reconstruction  Department of Otolaryngology - Head and Neck Surgery

## 2023-12-24 PROBLEM — E89.0 POSTOPERATIVE HYPOTHYROIDISM: Status: ACTIVE | Noted: 2023-12-04

## 2023-12-24 NOTE — ASSESSMENT & PLAN NOTE
For thyroid hormone withdrawal as such:  Start a low iodine diet on January 15, 2023  Stop Levothyroxine on January 15, 2023  For blood tests to determine treatment on January 22, 2023  To be treated with WEBER, ~100mCi  Counseled regarding the contact precautions needed following WEBER  To resume Levothyroxine 125mcg po daily once treatment is complete  Take levothyroxine on an empty stomach with water alone, 30-60 minutes before eating or taking other medications, 4 hours before any calcium or iron supplement  To resume a normal diet once radioactive iodine treatment is complete  For a whole body scan 5-7 days post treatment     For follow up 6 weeks after radioactive iodine therapy

## 2023-12-24 NOTE — ASSESSMENT & PLAN NOTE
To continue Levothyroxine 125mcg po daily once treatment is complete  Take levothyroxine on an empty stomach with water alone, 30-60 minutes before eating or taking other medications, 4 hours before any calcium or iron supplement

## 2023-12-26 ENCOUNTER — TELEPHONE (OUTPATIENT)
Dept: ENDOCRINOLOGY | Facility: CLINIC | Age: 63
End: 2023-12-26
Payer: COMMERCIAL

## 2023-12-26 NOTE — TELEPHONE ENCOUNTER
Patient called to inform Dr. Evans that her treatment was scheduled for 1/24/24 due to availability and not 1/22/24 as discussed at appointment. We advised that she starts low iodine diet and stop taking levothyroxine on 1/17/24 to prepare for treatment.     Patient would like to know if she is stop any of her other medications on 1/17/24? Please advise.

## 2024-01-22 ENCOUNTER — TELEPHONE (OUTPATIENT)
Dept: ENDOCRINOLOGY | Facility: CLINIC | Age: 64
End: 2024-01-22

## 2024-01-22 ENCOUNTER — LAB (OUTPATIENT)
Dept: LAB | Facility: LAB | Age: 64
End: 2024-01-22
Payer: COMMERCIAL

## 2024-01-22 DIAGNOSIS — E89.0 POSTOPERATIVE HYPOTHYROIDISM: Primary | ICD-10-CM

## 2024-01-22 DIAGNOSIS — C73 PAPILLARY THYROID CARCINOMA (MULTI): ICD-10-CM

## 2024-01-22 LAB — TSH SERPL-ACNC: 2.6 MIU/L (ref 0.44–3.98)

## 2024-01-22 PROCEDURE — 86800 THYROGLOBULIN ANTIBODY: CPT

## 2024-01-22 PROCEDURE — 84443 ASSAY THYROID STIM HORMONE: CPT

## 2024-01-22 PROCEDURE — 36415 COLL VENOUS BLD VENIPUNCTURE: CPT

## 2024-01-22 PROCEDURE — 84432 ASSAY OF THYROGLOBULIN: CPT

## 2024-01-22 NOTE — TELEPHONE ENCOUNTER
Pam from nuclear med in [facility] called to advise that patient had thyroid level checked today and her level was 2.60.  She states that the patient is scheduled for treatment on 1/24/2024 and wanted to know how you wanted to proceed.  Pam is available at [phone].

## 2024-01-24 ENCOUNTER — APPOINTMENT (OUTPATIENT)
Dept: RADIOLOGY | Facility: HOSPITAL | Age: 64
End: 2024-01-24
Payer: COMMERCIAL

## 2024-01-24 LAB
BILL ONLY-THYROGLOBULIN: NORMAL
THYROGLOB AB SERPL-ACNC: <0.9 IU/ML (ref 0–4)
THYROGLOB SERPL-MCNC: <0.1 NG/ML (ref 1.3–31.8)
THYROGLOB SERPL-MCNC: ABNORMAL NG/ML (ref 1.3–31.8)

## 2024-01-24 NOTE — TELEPHONE ENCOUNTER
Treatment needs to be delayed by at least one week until the TSH is elevated above 20.    Patient needs to continue to stay off her Levothyroxine and stick to the low iodine diet.

## 2024-01-24 NOTE — RESULT ENCOUNTER NOTE
This thyroid level is not yet appropriate for treatment.  Please continue to stay off Levothyroxine and stick to a low iodine diet.  Repeat labs in one week.

## 2024-01-29 ENCOUNTER — APPOINTMENT (OUTPATIENT)
Dept: RADIOLOGY | Facility: HOSPITAL | Age: 64
End: 2024-01-29
Payer: COMMERCIAL

## 2024-01-30 ENCOUNTER — LAB (OUTPATIENT)
Dept: LAB | Facility: LAB | Age: 64
End: 2024-01-30
Payer: COMMERCIAL

## 2024-01-30 DIAGNOSIS — C73 PAPILLARY THYROID CARCINOMA (MULTI): ICD-10-CM

## 2024-01-30 DIAGNOSIS — E89.0 POSTOPERATIVE HYPOTHYROIDISM: ICD-10-CM

## 2024-01-30 LAB
T4 FREE SERPL-MCNC: 0.43 NG/DL (ref 0.61–1.12)
TSH SERPL-ACNC: 31.02 MIU/L (ref 0.44–3.98)

## 2024-01-30 PROCEDURE — 84432 ASSAY OF THYROGLOBULIN: CPT

## 2024-01-30 PROCEDURE — 36415 COLL VENOUS BLD VENIPUNCTURE: CPT

## 2024-01-30 PROCEDURE — 86800 THYROGLOBULIN ANTIBODY: CPT

## 2024-01-30 PROCEDURE — 84439 ASSAY OF FREE THYROXINE: CPT

## 2024-01-30 PROCEDURE — 84443 ASSAY THYROID STIM HORMONE: CPT

## 2024-01-31 ENCOUNTER — HOSPITAL ENCOUNTER (OUTPATIENT)
Dept: RADIOLOGY | Facility: HOSPITAL | Age: 64
Discharge: HOME | End: 2024-01-31
Payer: COMMERCIAL

## 2024-01-31 DIAGNOSIS — C73 PAPILLARY THYROID CARCINOMA (MULTI): ICD-10-CM

## 2024-01-31 PROCEDURE — 79005 NUCLEAR RX ORAL ADMIN: CPT

## 2024-01-31 PROCEDURE — 3430000001 HC RX 343 DIAGNOSTIC RADIOPHARMACEUTICALS: Performed by: INTERNAL MEDICINE

## 2024-01-31 PROCEDURE — 79005 NUCLEAR RX ORAL ADMIN: CPT | Performed by: NUCLEAR MEDICINE

## 2024-01-31 PROCEDURE — A9517 I131 IODIDE CAP, RX: HCPCS | Performed by: INTERNAL MEDICINE

## 2024-01-31 RX ADMIN — SODIUM IODIDE I 131 157 MILLICURIE: 100 CAPSULE ORAL at 13:00

## 2024-02-05 ENCOUNTER — HOSPITAL ENCOUNTER (OUTPATIENT)
Dept: RADIOLOGY | Facility: HOSPITAL | Age: 64
Discharge: HOME | End: 2024-02-05
Payer: COMMERCIAL

## 2024-02-05 DIAGNOSIS — C73 MALIGNANT NEOPLASM OF THYROID GLAND (MULTI): ICD-10-CM

## 2024-02-05 PROCEDURE — 78018 THYROID MET IMAGING BODY: CPT

## 2024-02-05 PROCEDURE — 78830 RP LOCLZJ TUM SPECT W/CT 1: CPT

## 2024-02-05 PROCEDURE — 78830 RP LOCLZJ TUM SPECT W/CT 1: CPT | Performed by: RADIOLOGY

## 2024-02-08 ENCOUNTER — TELEPHONE (OUTPATIENT)
Dept: OTOLARYNGOLOGY | Facility: CLINIC | Age: 64
End: 2024-02-08
Payer: COMMERCIAL

## 2024-02-08 DIAGNOSIS — H93.12 TINNITUS OF LEFT EAR: ICD-10-CM

## 2024-02-08 NOTE — TELEPHONE ENCOUNTER
----- Message -----  From: Chloe Castañeda, RN  Sent: 2/8/2024   4:45 PM EST  To: Jen Taylor; Vandana Dalton MD; #    Hey guys:    Patient called me today reporting hearing her heartbeat in her L ear since her thyroid surgery in November. This has not resolved. Ordered a hearing test. Wondering if she can have this done when she sees us on 3/5? María: can you check if [facility] can accommodate her for an audio that day?   She just had her WEBER. She reports now having tearing and swollen eyes. Not entirely sure what is happening. Reports some eye goop as well. She called endocrinology who told her to call us. I told her to watch the eye as I'm concerned it could be an eye infection-pink eye of some sort in which she needs to see pcp. She also says her eyes feel dry which she is going to get refresh eye drops and keep us posted.     Dr. Dalton: any other suggestions?       ----- Message from Vandana Dalton MD sent at 2/8/2024  4:47 PM EST -----  Regarding: RE:  Agreed she should start refresh eye drops and see PCP. Agree with hearing test.     N

## 2024-02-20 NOTE — RESULT ENCOUNTER NOTE
Scan has been reviewed.  There is no evidence of metastatic disease, which correlates with the low thyroglobulin of 0.1.  For follow up as scheduled.

## 2024-02-20 NOTE — RESULT ENCOUNTER NOTE
Labs have been reviewed.  The tumor marker only went to 0.1 upon stimulation from the thyroid hormonal withdrawal test.  Labs will be repeated at the next appointment.

## 2024-02-27 NOTE — PROGRESS NOTES
WALLY Ponce is a 63 y.o. female here for follow up of her papillary thyroid carcinoma multifocal T3N1M0 s/p thyroidectomy and neck dissection on 11/22/23. She is concerned that she is going to develop further cancer due to her cancer history. She is still hearing her heartbeat in her L ear since 2 weeks after her thyroid surgery in November. This has not resolved. I have ordered a hearing test which she has scheduled last this month.  At last visit she had eye symptoms and has seen her ophthalmologist and has follow up with them later this month as well.      Since last visit she had her WEBER and her WBI scan 2/5/24  that showed no evidence of metastatic disease.  Her TG was 0.1 (stimulated).  She is back on her synthroid daily and is seeing Dr. Evans later this month.    I personally reviewed available images including:  - WBI    I personally reviewed her labwork as included in her timeline and history.      History:  Dx: Papillary thyroid carcinoma pT3b (multifocal) N1M0  Dx: CLL  1/23: Noticed a small nodule in her right neck, inc size   9/23: CT neck +new cystic mass 5.2x4.1x5.6cm w/n +enhancing nodule 1.2 x 0.8 x 1.3 cm, abuts or arises from the right thyroid lobe. The thyroid gland is diffusely heterogeneous, right greater than left but can't see a discrete nodule.  There is an additional cystic appearing node at level 2 on the right measuring 1.5 x 0.8 cm.   10/20/23: FNA right thyroid mass +classic papillary thyroid carcinoma  11/22/23: S/p total thyroidectomy and right radical neck dissection - path multifocal T3bN1  12/15/23: TG 0.1 TSH 0.2  1/22/24: TG <0.1 TSH 2.6  1/30/24: TG 0.1 (Stimulated) TSH 31  1/31/24: WEBER 157mCu   2/5/24: WBI scan with sm residual thyroid bed otherwise negative    ROS  Review of Systems   Constitutional:  Negative for appetite change, chills, fatigue, fever and unexpected weight change.   HENT:  Negative for dental problem, drooling, ear pain, facial swelling, hearing  loss, mouth sores, sore throat, trouble swallowing and voice change.         +heartbeat in her left ear only   Respiratory:  Negative for cough, shortness of breath and stridor.    Gastrointestinal:  Negative for nausea and vomiting.   Musculoskeletal:  Negative for neck pain.   Hematological:  Negative for adenopathy.   All other systems reviewed and are negative.       PE  PHYSICAL EXAMINATION:  Constitutional:  No acute distress  Voice:  No hoarseness or other abnormality  Respiration:  Breathing comfortably, no stridor  Cardiovascular:  No clubbing/cyanosis/edema in hands  Eyes:  sclera normal  Neuro:  Alert and oriented times 3, Cranial nerves II-XII grossly intact and symmetric bilaterally  Head and Face:  Symmetric facial features, no masses or lesions,  Nose:  External nose midline  Oral Cavity/Oropharynx/Lips:  MMM  Neck/Lymph:  Neck scar is well healed, right side across her midline neck.  Not on left.  Deformity associated with radical neck as expected.  Psych:  Alert and oriented with appropriate mood and affect    ASSESSMENT AND PLAN  Problem List Items Addressed This Visit       Papillary thyroid carcinoma (CMS/HCC)    Current Assessment & Plan     She is s/p total thyroidectomy & right neck dissection  Now s/p WEBER  WBI reviewed and looks good  TG reviewed and looks good  Recommend daily synthroid per Dr. Evans  Follow up in 6 months         Postoperative hypothyroidism - Primary    Current Assessment & Plan     She is back on her daily synthroid s/p total thyroidectomy  She will be managed by Dr. Evans and is seeing her later this month          Vandana Dalton MD    Head & Neck Surgical Oncology & Reconstruction  Department of Otolaryngology - Head and Neck Surgery        By signing my name below, IChloe Scribe, attest that this documentation has been prepared under the direction and in the presence of Dr. Vandana Dalton MD.     All medical record entries made by the [facility] were at  my direction and personally dictated by me, Dr. Vandana Dalton. I have reviewed the chart and agree that the record accurately reflects my personal performance of the history, physical exam, discussion and plan.

## 2024-03-04 ENCOUNTER — OFFICE VISIT (OUTPATIENT)
Dept: OTOLARYNGOLOGY | Facility: CLINIC | Age: 64
End: 2024-03-04
Payer: COMMERCIAL

## 2024-03-04 DIAGNOSIS — C73 PAPILLARY THYROID CARCINOMA (MULTI): ICD-10-CM

## 2024-03-04 DIAGNOSIS — H93.8X2 AUDIBLE HEARTBEAT IN LEFT EAR: ICD-10-CM

## 2024-03-04 DIAGNOSIS — E89.0 POSTOPERATIVE HYPOTHYROIDISM: Primary | ICD-10-CM

## 2024-03-04 PROBLEM — R22.1 NECK MASS: Status: RESOLVED | Noted: 2023-10-11 | Resolved: 2024-03-04

## 2024-03-04 PROCEDURE — 99214 OFFICE O/P EST MOD 30 MIN: CPT | Performed by: OTOLARYNGOLOGY

## 2024-03-04 PROCEDURE — 1036F TOBACCO NON-USER: CPT | Performed by: OTOLARYNGOLOGY

## 2024-03-04 RX ORDER — LEVOTHYROXINE SODIUM 125 UG/1
125 TABLET ORAL DAILY
Qty: 7 TABLET | Refills: 0 | Status: SHIPPED | OUTPATIENT
Start: 2024-03-04 | End: 2024-03-18 | Stop reason: SDUPTHER

## 2024-03-04 ASSESSMENT — ENCOUNTER SYMPTOMS
COUGH: 0
DEPRESSION: 0
FACIAL SWELLING: 0
NAUSEA: 0
STRIDOR: 0
NECK PAIN: 0
UNEXPECTED WEIGHT CHANGE: 0
VOICE CHANGE: 0
CHILLS: 0
VOMITING: 0
SHORTNESS OF BREATH: 0
FEVER: 0
TROUBLE SWALLOWING: 0
FATIGUE: 0
ADENOPATHY: 0
SORE THROAT: 0
APPETITE CHANGE: 0

## 2024-03-04 NOTE — ASSESSMENT & PLAN NOTE
She is back on her daily synthroid s/p total thyroidectomy  She will be managed by Dr. Evans and is seeing her later this month

## 2024-03-04 NOTE — ASSESSMENT & PLAN NOTE
She is s/p total thyroidectomy & right neck dissection  Now s/p WEBER  WBI reviewed and looks good  TG reviewed and looks good  Recommend daily synthroid per Dr. Evans  Follow up in 6 months

## 2024-03-04 NOTE — PATIENT INSTRUCTIONS
Dr. Dalton evaluated you today.    Your care plan is outlined below:  -- Schedule carotid US  -- Follow up with Dr. Dalton in 6 months.  This appointment was scheduled at the end of your visit today.  If you need to reschedule, please call the office at [phone].  Please keep in mind that last minute cancellations often result in delayed follow-up appointments.     General appointment line please call [phone]  For general questions or scheduling issues please call [phone] option #2   For medical questions or surgery scheduling please call [phone] on Mondays, Wednesdays and Thursdays or [phone] on Tuesdays and Fridays. Please be sure to leave a voice mail or your call will not be able to be returned.     Dr. Dalton makes every effort to run on time for your appointments.  Therefore, if you are more than 30 minutes late for your appointment, unrelated to a scan or another appointment such as chemotherapy or radiation, your appointment will need to be rescheduled to another day.  We appreciate your understanding.

## 2024-03-05 ENCOUNTER — APPOINTMENT (OUTPATIENT)
Dept: ENDOCRINOLOGY | Facility: HOSPITAL | Age: 64
End: 2024-03-05
Payer: COMMERCIAL

## 2024-03-12 ENCOUNTER — HOSPITAL ENCOUNTER (OUTPATIENT)
Dept: VASCULAR MEDICINE | Facility: HOSPITAL | Age: 64
Discharge: HOME | End: 2024-03-12
Payer: COMMERCIAL

## 2024-03-12 DIAGNOSIS — R09.89 OTHER SPECIFIED SYMPTOMS AND SIGNS INVOLVING THE CIRCULATORY AND RESPIRATORY SYSTEMS: ICD-10-CM

## 2024-03-12 DIAGNOSIS — H93.8X2 AUDIBLE HEARTBEAT IN LEFT EAR: ICD-10-CM

## 2024-03-12 PROCEDURE — 93880 EXTRACRANIAL BILAT STUDY: CPT | Performed by: SURGERY

## 2024-03-12 PROCEDURE — 93880 EXTRACRANIAL BILAT STUDY: CPT

## 2024-03-14 NOTE — PATIENT INSTRUCTIONS
Thank you for choosing [facility]  for your health care needs.  If you have any questions, concerns or medical needs, please feel free to contact our office at [phone].    Please ensure you complete your blood work one week before the next scheduled appointment.    To continue Levothyroxine 125mcg po daily  Take levothyroxine on an empty stomach with water alone, 30-60 minutes before eating or taking other medications, 4 hours before any calcium or iron supplement  To obtain blood tests today   For follow up in 4 months

## 2024-03-14 NOTE — PROGRESS NOTES
Subjective   Ms. Amita Ponce is a 63-year-old female who presents for follow up for metastatic papillary thyroid cancer.      In January 2023, she noticed a small nodule in her right neck which increased in size.  CT scan of the neck was obtained in September 2023 where she was found to have a new cystic mass measuring 5.2 x 4.1 x 5.6cm within an enhancing nodule 1.2 x 0.8 x 1.3 cm, abuts or arising from the right thyroid lobe. The thyroid gland is diffusely heterogeneous, right greater than left but can't see a discrete nodule.  There is an additional cystic appearing node at level 2 on the right measuring 1.5 x 0.8 cm.     She underwent a FNAB of the right mass on October 20, 2023.  Cytology revealed classic papillary thyroid carcinoma.    She underwent a total thyroidectomy and right radical neck dissection on November 22, 2023.      Surgical pathology revealed:  A. No pathological diagnosis: Parathyroid (right inferior) biopsy     B. 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 Classic papillary carcinoma, 6.5 cm, with extensive hemorrhage and extrathyroidal extension;   underlying follicular nodular disease Thyroid       Metastatic papillary thyroid carcinoma: Lymph nodes, 5 of 21             - right hemithyroidectomy and neck dissection specimen     D. Metastatic papillary thyroid carcinoma, largest 0.3 cm:   Lymph nodes, 4 of 12 (central neck) dissection specimen     E. Papillary carcinoma, 1.0 cm, and follicular nodular disease:   Thyroid, left completion thyroidectomy specimen      She underwent radioactive iodine therapy with 157mCI on January 31, 2024.    Whole-body scan from February 5, 2024 revealed:  IMPRESSION:  1. Recent high dose radioiodine (I-131) therapy for thyroid carcinoma.  2. Focal radiotracer uptake within the surgical bed is consistent  with residual disease.  3. No evidence of radiotracer uptake elsewhere  "throughout the body to  suggest metastatic disease.    She has had no major changes to her health since her last appointment.    Current Regimen  Levothyroxine 125mcg po daily     Symptoms are as listed below:  Energy levels -  fatigued   Sleep -  uses CPAP but has poor quality   Bowel movemetns - variable; can be explosive 3-4 days per week   Hair hcanges - dry   Skin changes - dry   Temperature intolerances - denies   Palpitations - denies   Tremors -denies   Mood chnges - anxiety when  is around   Weight changes -  gained 16 pounds    Review of Systems   Constitutional:  Positive for diaphoresis (At night), fatigue, fever and unexpected weight change (Gained).   HENT:  Positive for dental problem and hearing loss.         Dry mouth    Eyes:         Lacrimation   Dry eye    Respiratory:  Positive for apnea and shortness of breath.    Gastrointestinal:  Positive for diarrhea.   Endocrine: Positive for polydipsia.   Musculoskeletal:  Positive for neck pain.   Skin:         Dry skin    Psychiatric/Behavioral:  The patient is nervous/anxious.    All other systems reviewed and are negative.    Objective   /78 (BP Location: Left arm, Patient Position: Sitting, BP Cuff Size: Adult)   Pulse 71   Ht 1.676 m (5' 6\")   Wt 115 kg (254 lb)   BMI 41.00 kg/m²    Physical Exam  Vitals and nursing note reviewed.   Constitutional:       General: She is not in acute distress.     Appearance: Normal appearance. She is obese.   HENT:      Head: Normocephalic and atraumatic.      Nose: Nose normal.      Mouth/Throat:      Mouth: Mucous membranes are moist.   Eyes:      Extraocular Movements: Extraocular movements intact.   Neck:      Comments: Healed thyroidectomy scar and right neck scar   Cardiovascular:      Pulses: Normal pulses.      Heart sounds: Normal heart sounds.   Pulmonary:      Effort: Pulmonary effort is normal.      Breath sounds: Normal breath sounds.   Musculoskeletal:         General: Normal range of " motion.   Lymphadenopathy:      Cervical: No cervical adenopathy.   Neurological:      Mental Status: She is alert and oriented to person, place, and time.   Psychiatric:         Mood and Affect: Mood normal.         Lab Results   Component Value Date    TSH 31.02 (H) 01/30/2024    FREET4 0.43 (L) 01/30/2024     === 10/20/23 ===    US GUIDED THYROID BIOPSY    - Impression -  Technically successful ultrasound-guided fine-needle aspiration, core  biopsy as well as fluid aspiration of a solid and cystic thyroid  nodule in the right lobe of the thyroid gland.    Signed by: Tara Peralta 10/21/2023 11:23 AM  Dictation workstation:   [id number]    === 09/08/23 ===    CT SOFT TISSUE NECK W IV CONTRAST    - Impression -  There is a new cystic mass with an enhancing nodule in the right  lower neck measuring 5.2 x 4.1 x 5.6 cm. The enhancing nodule  measures 1.2 x 0.8 x 1.3 cm. The inferior component of the mass  either abuts or arises from the right thyroid lobe. The thyroid gland  is diffusely heterogeneous, right greater than left. Consider  correlation with tissue sampling.    There is an additional cystic appearing node at level 2 on the right  measuring 1.5 x 0.8 cm.    There are bilateral prominent lymph nodes decreased in size since the  prior exam 10/25/2012 compatible with the patient's history of  lymphoma.    A notify message was sent.    MACRO:  None      Assessment/Plan   63-year-old female presents for follow up for metastatic papillary thyroid cancer.  She underwent a total thyroidectomy and right radical neck dissection on November 22, 2023.  She was found to have Stage II disease with [id number].  She underwent radioactive iodine therapy with 157mCI on January 31, 2024.    She has a history of CLL also but does not currently follow with hematology/oncology.    Postoperative hypothyroidism  To continue Levothyroxine 125mcg po daily  Take levothyroxine on an empty stomach with water alone, 30-60 minutes before  eating or taking other medications, 4 hours before any calcium or iron supplement  To obtain blood tests today       Papillary thyroid carcinoma (CMS/HCC)  To obtain TG and anti-TG levels        CLL (chronic lymphocytic leukemia) (CMS/HCC)  To obtain CBC    Screening for diabetes mellitus  To obtain A1C given polydipsia     For follow up in 4 months

## 2024-03-15 ENCOUNTER — LAB (OUTPATIENT)
Dept: LAB | Facility: LAB | Age: 64
End: 2024-03-15
Payer: COMMERCIAL

## 2024-03-15 ENCOUNTER — OFFICE VISIT (OUTPATIENT)
Dept: ENDOCRINOLOGY | Facility: CLINIC | Age: 64
End: 2024-03-15
Payer: COMMERCIAL

## 2024-03-15 VITALS
WEIGHT: 254 LBS | DIASTOLIC BLOOD PRESSURE: 78 MMHG | HEART RATE: 71 BPM | BODY MASS INDEX: 40.82 KG/M2 | HEIGHT: 66 IN | SYSTOLIC BLOOD PRESSURE: 122 MMHG

## 2024-03-15 DIAGNOSIS — Z13.1 SCREENING FOR DIABETES MELLITUS: ICD-10-CM

## 2024-03-15 DIAGNOSIS — C73 PAPILLARY THYROID CARCINOMA (MULTI): ICD-10-CM

## 2024-03-15 DIAGNOSIS — E89.0 POSTOPERATIVE HYPOTHYROIDISM: Primary | ICD-10-CM

## 2024-03-15 DIAGNOSIS — E89.0 POSTOPERATIVE HYPOTHYROIDISM: ICD-10-CM

## 2024-03-15 DIAGNOSIS — C91.10 CLL (CHRONIC LYMPHOCYTIC LEUKEMIA) (MULTI): ICD-10-CM

## 2024-03-15 LAB
ERYTHROCYTE [DISTWIDTH] IN BLOOD BY AUTOMATED COUNT: 13.3 % (ref 11.5–14.5)
HCT VFR BLD AUTO: 44.2 % (ref 36–46)
HGB BLD-MCNC: 13.9 G/DL (ref 12–16)
MCH RBC QN AUTO: 28 PG (ref 26–34)
MCHC RBC AUTO-ENTMCNC: 31.4 G/DL (ref 32–36)
MCV RBC AUTO: 89 FL (ref 80–100)
NRBC BLD-RTO: 0 /100 WBCS (ref 0–0)
PLATELET # BLD AUTO: 191 X10*3/UL (ref 150–450)
RBC # BLD AUTO: 4.97 X10*6/UL (ref 4–5.2)
T4 FREE SERPL-MCNC: 0.94 NG/DL (ref 0.61–1.12)
TSH SERPL-ACNC: 0.55 MIU/L (ref 0.44–3.98)
WBC # BLD AUTO: 6.2 X10*3/UL (ref 4.4–11.3)

## 2024-03-15 PROCEDURE — 86800 THYROGLOBULIN ANTIBODY: CPT

## 2024-03-15 PROCEDURE — 83036 HEMOGLOBIN GLYCOSYLATED A1C: CPT

## 2024-03-15 PROCEDURE — 85027 COMPLETE CBC AUTOMATED: CPT

## 2024-03-15 PROCEDURE — 84443 ASSAY THYROID STIM HORMONE: CPT

## 2024-03-15 PROCEDURE — 36415 COLL VENOUS BLD VENIPUNCTURE: CPT

## 2024-03-15 PROCEDURE — 84432 ASSAY OF THYROGLOBULIN: CPT

## 2024-03-15 PROCEDURE — 84439 ASSAY OF FREE THYROXINE: CPT

## 2024-03-15 PROCEDURE — 99214 OFFICE O/P EST MOD 30 MIN: CPT | Performed by: INTERNAL MEDICINE

## 2024-03-15 PROCEDURE — 1036F TOBACCO NON-USER: CPT | Performed by: INTERNAL MEDICINE

## 2024-03-16 PROBLEM — C91.10 CLL (CHRONIC LYMPHOCYTIC LEUKEMIA) (MULTI): Status: ACTIVE | Noted: 2024-03-16

## 2024-03-16 PROBLEM — Z13.1 SCREENING FOR DIABETES MELLITUS: Status: ACTIVE | Noted: 2024-03-16

## 2024-03-16 LAB
EST. AVERAGE GLUCOSE BLD GHB EST-MCNC: 131 MG/DL
HBA1C MFR BLD: 6.2 %

## 2024-03-16 ASSESSMENT — ENCOUNTER SYMPTOMS
APNEA: 1
POLYDIPSIA: 1
FEVER: 1
NERVOUS/ANXIOUS: 1
NECK PAIN: 1
DIAPHORESIS: 1
DIARRHEA: 1
SHORTNESS OF BREATH: 1
UNEXPECTED WEIGHT CHANGE: 1
FATIGUE: 1
ROS SKIN COMMENTS: DRY SKIN

## 2024-03-16 NOTE — ASSESSMENT & PLAN NOTE
To continue Levothyroxine 125mcg po daily  Take levothyroxine on an empty stomach with water alone, 30-60 minutes before eating or taking other medications, 4 hours before any calcium or iron supplement  To obtain blood tests today

## 2024-03-18 DIAGNOSIS — C73 PAPILLARY THYROID CARCINOMA (MULTI): ICD-10-CM

## 2024-03-18 DIAGNOSIS — E89.0 POSTOPERATIVE HYPOTHYROIDISM: Primary | ICD-10-CM

## 2024-03-18 DIAGNOSIS — R73.03 PREDIABETES: ICD-10-CM

## 2024-03-18 NOTE — TELEPHONE ENCOUNTER
Patient state she only has 1 pill of levothyroxine left, patient is asking if you can review labs and send new rx to [facility]

## 2024-03-19 RX ORDER — LEVOTHYROXINE SODIUM 125 UG/1
125 TABLET ORAL
Qty: 90 TABLET | Refills: 1 | Status: SHIPPED | OUTPATIENT
Start: 2024-03-19 | End: 2024-06-10 | Stop reason: SDUPTHER

## 2024-03-19 NOTE — RESULT ENCOUNTER NOTE
Labs have been reviewed.  The thyroid levels look good.  The tumor marker is undetectable.  Please continue the same dose of Levothyroxine.  The white count is normal.  The A1C was found to be 6.2%, which is in the prediabetic range.  Type II diabetes starts at a value of 6.5%.  Please start a low carb diet and exercise five days per week for at least 20 minutes in order to prevent progression to type II diabetes.  For follow up as scheduled with repeat labs.

## 2024-03-20 RX ORDER — SODIUM IODIDE I 131 100 MCI/1
157 CAPSULE ORAL
Status: COMPLETED | OUTPATIENT
Start: 2024-03-20 | End: 2024-01-31

## 2024-03-20 RX ORDER — SODIUM IODIDE I 131 100 MCI/1
157 CAPSULE ORAL
OUTPATIENT
Start: 2024-03-20

## 2024-03-20 NOTE — ADDENDUM NOTE
Encounter addended by: Blanca Sánchez on: 3/20/2024 3:51 PM   Actions taken: Imaging Exam ended, Order list changed

## 2024-03-27 ENCOUNTER — CLINICAL SUPPORT (OUTPATIENT)
Dept: AUDIOLOGY | Facility: CLINIC | Age: 64
End: 2024-03-27
Payer: COMMERCIAL

## 2024-03-27 DIAGNOSIS — H93.A2 PULSATILE TINNITUS, LEFT EAR: Primary | ICD-10-CM

## 2024-03-27 DIAGNOSIS — H90.3 SENSORINEURAL HEARING LOSS, BILATERAL: ICD-10-CM

## 2024-03-27 DIAGNOSIS — H93.12 TINNITUS OF LEFT EAR: ICD-10-CM

## 2024-03-27 PROCEDURE — 92567 TYMPANOMETRY: CPT | Performed by: AUDIOLOGIST

## 2024-03-27 PROCEDURE — 92557 COMPREHENSIVE HEARING TEST: CPT | Performed by: AUDIOLOGIST
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
## 2024-05-23 ENCOUNTER — TELEPHONE (OUTPATIENT)
Dept: ENDOCRINOLOGY | Facility: CLINIC | Age: 64
End: 2024-05-23
Payer: COMMERCIAL

## 2024-05-23 NOTE — TELEPHONE ENCOUNTER
Maribel from [facility] has called to confirm if documents were received. A care consideration has been sent. Maribel has been advised that documents have not been received yet. However, Dr. Evans does not participate with care considerations.

## 2024-06-10 DIAGNOSIS — C73 PAPILLARY THYROID CARCINOMA (MULTI): ICD-10-CM

## 2024-06-10 RX ORDER — LEVOTHYROXINE SODIUM 125 UG/1
125 TABLET ORAL
Qty: 90 TABLET | Refills: 1 | Status: SHIPPED | OUTPATIENT
Start: 2024-06-10 | End: 2024-12-07

## 2024-07-22 ENCOUNTER — LAB (OUTPATIENT)
Dept: LAB | Facility: LAB | Age: 64
End: 2024-07-22
Payer: COMMERCIAL

## 2024-07-22 DIAGNOSIS — R73.03 PREDIABETES: ICD-10-CM

## 2024-07-22 DIAGNOSIS — C73 PAPILLARY THYROID CARCINOMA (MULTI): ICD-10-CM

## 2024-07-22 DIAGNOSIS — E89.0 POSTOPERATIVE HYPOTHYROIDISM: ICD-10-CM

## 2024-07-22 LAB
ANION GAP SERPL CALC-SCNC: 8 MMOL/L (ref 10–20)
BUN SERPL-MCNC: 11 MG/DL (ref 6–23)
CALCIUM SERPL-MCNC: 9.7 MG/DL (ref 8.6–10.3)
CHLORIDE SERPL-SCNC: 107 MMOL/L (ref 98–107)
CO2 SERPL-SCNC: 31 MMOL/L (ref 21–32)
CREAT SERPL-MCNC: 0.66 MG/DL (ref 0.5–1.05)
EGFRCR SERPLBLD CKD-EPI 2021: >90 ML/MIN/1.73M*2
EST. AVERAGE GLUCOSE BLD GHB EST-MCNC: 131 MG/DL
GLUCOSE SERPL-MCNC: 106 MG/DL (ref 74–99)
HBA1C MFR BLD: 6.2 %
POTASSIUM SERPL-SCNC: 4.5 MMOL/L (ref 3.5–5.3)
SODIUM SERPL-SCNC: 141 MMOL/L (ref 136–145)
T4 FREE SERPL-MCNC: 1.06 NG/DL (ref 0.61–1.12)
TSH SERPL-ACNC: 0.4 MIU/L (ref 0.44–3.98)

## 2024-07-22 PROCEDURE — 86800 THYROGLOBULIN ANTIBODY: CPT

## 2024-07-22 PROCEDURE — 84439 ASSAY OF FREE THYROXINE: CPT

## 2024-07-22 PROCEDURE — 36415 COLL VENOUS BLD VENIPUNCTURE: CPT

## 2024-07-22 PROCEDURE — 84443 ASSAY THYROID STIM HORMONE: CPT

## 2024-07-22 PROCEDURE — 80048 BASIC METABOLIC PNL TOTAL CA: CPT

## 2024-07-22 PROCEDURE — 83036 HEMOGLOBIN GLYCOSYLATED A1C: CPT

## 2024-07-22 PROCEDURE — 84432 ASSAY OF THYROGLOBULIN: CPT

## 2024-07-26 ENCOUNTER — APPOINTMENT (OUTPATIENT)
Dept: ENDOCRINOLOGY | Facility: CLINIC | Age: 64
End: 2024-07-26
Payer: COMMERCIAL

## 2024-07-26 VITALS
BODY MASS INDEX: 42.11 KG/M2 | HEIGHT: 66 IN | DIASTOLIC BLOOD PRESSURE: 62 MMHG | SYSTOLIC BLOOD PRESSURE: 112 MMHG | WEIGHT: 262 LBS | HEART RATE: 89 BPM

## 2024-07-26 DIAGNOSIS — E89.0 POSTOPERATIVE HYPOTHYROIDISM: ICD-10-CM

## 2024-07-26 DIAGNOSIS — R63.5 WEIGHT GAIN: ICD-10-CM

## 2024-07-26 DIAGNOSIS — M25.561 ARTHRALGIA OF BOTH KNEES: ICD-10-CM

## 2024-07-26 DIAGNOSIS — M25.562 ARTHRALGIA OF BOTH KNEES: ICD-10-CM

## 2024-07-26 DIAGNOSIS — R53.83 OTHER FATIGUE: Primary | ICD-10-CM

## 2024-07-26 DIAGNOSIS — L65.9 HAIR LOSS: ICD-10-CM

## 2024-07-26 DIAGNOSIS — C73 PAPILLARY THYROID CARCINOMA (MULTI): ICD-10-CM

## 2024-07-26 DIAGNOSIS — R73.03 PREDIABETES: ICD-10-CM

## 2024-07-26 PROCEDURE — 3008F BODY MASS INDEX DOCD: CPT | Performed by: INTERNAL MEDICINE

## 2024-07-26 PROCEDURE — 99215 OFFICE O/P EST HI 40 MIN: CPT | Performed by: INTERNAL MEDICINE

## 2024-07-26 RX ORDER — PLANT STANOL ESTER 450 MG
2400 TABLET ORAL DAILY
COMMUNITY

## 2024-07-26 RX ORDER — LEVOTHYROXINE SODIUM 125 UG/1
125 TABLET ORAL DAILY
Qty: 30 TABLET | Refills: 5 | Status: SHIPPED | OUTPATIENT
Start: 2024-07-26 | End: 2025-01-22

## 2024-07-26 ASSESSMENT — ENCOUNTER SYMPTOMS
DIAPHORESIS: 1
DIZZINESS: 1
ARTHRALGIAS: 1
UNEXPECTED WEIGHT CHANGE: 1
APNEA: 1
NERVOUS/ANXIOUS: 1
ROS SKIN COMMENTS: DRY SKIN
FATIGUE: 1

## 2024-07-26 NOTE — PROGRESS NOTES
Subjective   Ms. Amita Ponce is a 64-year-old female who presents for follow up for metastatic papillary thyroid cancer.      In January 2023, she noticed a small nodule in her right neck which increased in size.  CT scan of the neck was obtained in September 2023 where she was found to have a new cystic mass measuring 5.2 x 4.1 x 5.6cm within an enhancing nodule 1.2 x 0.8 x 1.3 cm, abuts or arising from the right thyroid lobe. The thyroid gland is diffusely heterogeneous, right greater than left but can't see a discrete nodule.  There is an additional cystic appearing node at level 2 on the right measuring 1.5 x 0.8 cm.     She underwent a FNAB of the right mass on October 20, 2023.  Cytology revealed classic papillary thyroid carcinoma.    She underwent a total thyroidectomy and right radical neck dissection on November 22, 2023.      Surgical pathology revealed:  A. No pathological diagnosis: Parathyroid (right inferior) biopsy     B. 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 Classic papillary carcinoma, 6.5 cm, with extensive hemorrhage and extrathyroidal extension;   underlying follicular nodular disease Thyroid       Metastatic papillary thyroid carcinoma: Lymph nodes, 5 of 21             - right hemithyroidectomy and neck dissection specimen     D. Metastatic papillary thyroid carcinoma, largest 0.3 cm:   Lymph nodes, 4 of 12 (central neck) dissection specimen     E. Papillary carcinoma, 1.0 cm, and follicular nodular disease:   Thyroid, left completion thyroidectomy specimen      She underwent radioactive iodine therapy with 157mCI on January 31, 2024.    Whole-body scan from February 5, 2024 revealed:  IMPRESSION:  1. Recent high dose radioiodine (I-131) therapy for thyroid carcinoma.  2. Focal radiotracer uptake within the surgical bed is consistent  with residual disease.  3. No evidence of radiotracer uptake elsewhere  "throughout the body to  suggest metastatic disease.    She has had no major changes to her health since her last appointment.    Current Regimen  Levothyroxine 125mcg po daily     She is bothered by:  Gaining weight  Dry eye  Hair loss  Night sweats  Fatigued   She has felt as though she can pass out on 3-4 occasions.    Nails are holding tips  Toenails have grown in   Hears heartbeat in one ear  Sleep quality is poor despite using black TV screen and having no phones in the room     She makes her own dressings  Water with vinegar   Burbuorin  Tumeric   Mushrooms  Eats fiber, protein, carbbs and then fruits   Diarrhea has resolved   Restuless legs have improved   She has been pumping calves after eating     Her father  in 2024  Her step mother is in the process of dying now       She is not interestd in using metformin as her daughter put on weight and became depressed with the use of metformin    She tried phentermine for weight loss 20 years ago but regained all the weight    Exercise - Pool for 30 mins     Review of Systems   Constitutional:  Positive for diaphoresis, fatigue and unexpected weight change (Weight gain).   HENT:          Dry mouth    Eyes:         Dry eye    Respiratory:  Positive for apnea and shortness of breath (With exertion).    Musculoskeletal:  Positive for arthralgias.   Skin:         Dry skin    Neurological:  Positive for dizziness.   Psychiatric/Behavioral:  The patient is nervous/anxious.    All other systems reviewed and are negative.    Objective   /62 (BP Location: Left arm, Patient Position: Sitting, BP Cuff Size: Large adult)   Pulse 89   Ht 1.676 m (5' 6\")   Wt 119 kg (262 lb)   BMI 42.29 kg/m²    Physical Exam  Vitals and nursing note reviewed.   Constitutional:       General: She is not in acute distress.     Appearance: Normal appearance. She is obese.   HENT:      Head: Normocephalic and atraumatic.      Nose: Nose normal.      Mouth/Throat:      Mouth: " Mucous membranes are moist.   Eyes:      Extraocular Movements: Extraocular movements intact.   Neck:      Comments: Healed thyroidectomy scar and right neck scar   Pulmonary:      Effort: Pulmonary effort is normal.   Musculoskeletal:         General: Normal range of motion.   Neurological:      Mental Status: She is alert and oriented to person, place, and time.         Lab Results   Component Value Date    TSH 0.40 (L) 07/22/2024    FREET4 1.06 07/22/2024     === 10/20/23 ===    US GUIDED THYROID BIOPSY    - Impression -  Technically successful ultrasound-guided fine-needle aspiration, core  biopsy as well as fluid aspiration of a solid and cystic thyroid  nodule in the right lobe of the thyroid gland.    Signed by: Tara Peralta 10/21/2023 11:23 AM  Dictation workstation:   [id number]    === 09/08/23 ===    CT SOFT TISSUE NECK W IV CONTRAST    - Impression -  There is a new cystic mass with an enhancing nodule in the right  lower neck measuring 5.2 x 4.1 x 5.6 cm. The enhancing nodule  measures 1.2 x 0.8 x 1.3 cm. The inferior component of the mass  either abuts or arises from the right thyroid lobe. The thyroid gland  is diffusely heterogeneous, right greater than left. Consider  correlation with tissue sampling.    There is an additional cystic appearing node at level 2 on the right  measuring 1.5 x 0.8 cm.    There are bilateral prominent lymph nodes decreased in size since the  prior exam 10/25/2012 compatible with the patient's history of  lymphoma.    A notify message was sent.    MACRO:  None      Assessment/Plan   64-year-old female presents for follow up for metastatic papillary thyroid cancer.  She underwent a total thyroidectomy and right radical neck dissection on November 22, 2023.  She was found to have Stage II disease with [id number].  She underwent radioactive iodine therapy with 157mCI on January 31, 2024.    She has a history of CLL also but does not currently follow with hematology/oncology and  desires to have a CBC drawn routinely with her thyroid labs.      Postoperative hypothyroidism  To discontinue Levothyroxine 125mcg po daily  To commence Synthroid 125mcg po daily   Take levothyroxine on an empty stomach with water alone, 30-60 minutes before eating or taking other medications, 4 hours before any calcium or iron supplement      Papillary thyroid carcinoma (Multi)  To obtain TG and anti-TG values before the next appointment     Prediabetes  To commence Rybelsus 3mg once daily for 30 days and then increase to 7mg once daily for 30 days and then 14mg once  daily     Other fatigue  To obtain CBC, iron panel     Weight gain  To obtain adrenal studies     Arthralgia of both knees  To obtain Vitamin D level    Can try Magnesium for sleep    For follow up in 4 months

## 2024-07-26 NOTE — PATIENT INSTRUCTIONS
Thank you for choosing [facility]  for your health care needs.  If you have any questions, concerns or medical needs, please feel free to contact our office at [phone].    Please ensure you complete your blood work one week before the next scheduled appointment.    To discontinue Levothyroxine 125mcg po daily  To commence Synthroid 125mcg po daily   Take levothyroxine on an empty stomach with water alone, 30-60 minutes before eating or taking other medications, 4 hours before any calcium or iron supplement  To commence Rybelsus 3mg once daily for 30 days and then increase to 7mg once daily for 30 days and then 14mg once  daily   Can try Magnesium for sleep  For follow up in 4 months

## 2024-07-28 PROBLEM — R53.83 OTHER FATIGUE: Status: ACTIVE | Noted: 2024-07-28

## 2024-07-28 PROBLEM — M25.562 ARTHRALGIA OF BOTH KNEES: Status: ACTIVE | Noted: 2024-07-28

## 2024-07-28 PROBLEM — R63.5 WEIGHT GAIN: Status: ACTIVE | Noted: 2024-07-28

## 2024-07-28 PROBLEM — M25.561 ARTHRALGIA OF BOTH KNEES: Status: ACTIVE | Noted: 2024-07-28

## 2024-07-28 PROBLEM — R73.03 PREDIABETES: Status: ACTIVE | Noted: 2024-07-28

## 2024-07-28 PROBLEM — L65.9 HAIR LOSS: Status: ACTIVE | Noted: 2024-07-28

## 2024-07-28 ASSESSMENT — ENCOUNTER SYMPTOMS: SHORTNESS OF BREATH: 1

## 2024-07-28 NOTE — ASSESSMENT & PLAN NOTE
To commence Rybelsus 3mg once daily for 30 days and then increase to 7mg once daily for 30 days and then 14mg once  daily

## 2024-07-28 NOTE — ASSESSMENT & PLAN NOTE
To discontinue Levothyroxine 125mcg po daily  To commence Synthroid 125mcg po daily   Take levothyroxine on an empty stomach with water alone, 30-60 minutes before eating or taking other medications, 4 hours before any calcium or iron supplement

## 2024-07-29 ENCOUNTER — TELEPHONE (OUTPATIENT)
Dept: ENDOCRINOLOGY | Facility: CLINIC | Age: 64
End: 2024-07-29

## 2024-07-29 DIAGNOSIS — E89.0 POSTOPERATIVE HYPOTHYROIDISM: ICD-10-CM

## 2024-07-29 DIAGNOSIS — C73 PAPILLARY THYROID CARCINOMA (MULTI): ICD-10-CM

## 2024-07-29 DIAGNOSIS — R73.03 PREDIABETES: ICD-10-CM

## 2024-07-29 NOTE — TELEPHONE ENCOUNTER
Patient insurance denied rybelsus 3mg see attached

## 2024-07-29 NOTE — TELEPHONE ENCOUNTER
Patient insurance denied synthroid see denial letter

## 2024-08-01 DIAGNOSIS — Z00.00 ENCOUNTER FOR GENERAL ADULT MEDICAL EXAMINATION WITHOUT ABNORMAL FINDINGS: ICD-10-CM

## 2024-08-01 DIAGNOSIS — R94.5 ABNORMAL RESULTS OF LIVER FUNCTION STUDIES: ICD-10-CM

## 2024-08-01 DIAGNOSIS — R73.01 IMPAIRED FASTING GLUCOSE: ICD-10-CM

## 2024-08-01 DIAGNOSIS — C91.10 CHRONIC LYMPHOCYTIC LEUKEMIA OF B-CELL TYPE NOT HAVING ACHIEVED REMISSION (MULTI): Primary | ICD-10-CM

## 2024-08-02 ENCOUNTER — LAB (OUTPATIENT)
Dept: LAB | Facility: LAB | Age: 64
End: 2024-08-02
Payer: COMMERCIAL

## 2024-08-02 DIAGNOSIS — R94.5 ABNORMAL RESULTS OF LIVER FUNCTION STUDIES: ICD-10-CM

## 2024-08-02 DIAGNOSIS — M25.562 ARTHRALGIA OF BOTH KNEES: ICD-10-CM

## 2024-08-02 DIAGNOSIS — L65.9 HAIR LOSS: ICD-10-CM

## 2024-08-02 DIAGNOSIS — M25.561 ARTHRALGIA OF BOTH KNEES: ICD-10-CM

## 2024-08-02 DIAGNOSIS — R53.83 OTHER FATIGUE: ICD-10-CM

## 2024-08-02 DIAGNOSIS — R73.01 IMPAIRED FASTING GLUCOSE: ICD-10-CM

## 2024-08-02 DIAGNOSIS — Z00.00 ENCOUNTER FOR GENERAL ADULT MEDICAL EXAMINATION WITHOUT ABNORMAL FINDINGS: ICD-10-CM

## 2024-08-02 DIAGNOSIS — C91.10 CHRONIC LYMPHOCYTIC LEUKEMIA OF B-CELL TYPE NOT HAVING ACHIEVED REMISSION (MULTI): ICD-10-CM

## 2024-08-02 DIAGNOSIS — R63.5 WEIGHT GAIN: ICD-10-CM

## 2024-08-02 LAB
25(OH)D3 SERPL-MCNC: 47 NG/ML (ref 30–100)
ALBUMIN SERPL BCP-MCNC: 4.1 G/DL (ref 3.4–5)
ALP SERPL-CCNC: 87 U/L (ref 33–136)
ALT SERPL W P-5'-P-CCNC: 46 U/L (ref 7–45)
ANION GAP SERPL CALC-SCNC: 9 MMOL/L (ref 10–20)
AST SERPL W P-5'-P-CCNC: 32 U/L (ref 9–39)
BASOPHILS # BLD AUTO: 0.03 X10*3/UL (ref 0–0.1)
BASOPHILS NFR BLD AUTO: 0.6 %
BILIRUB SERPL-MCNC: 0.4 MG/DL (ref 0–1.2)
BUN SERPL-MCNC: 12 MG/DL (ref 6–23)
CALCIUM SERPL-MCNC: 9.4 MG/DL (ref 8.6–10.3)
CHLORIDE SERPL-SCNC: 107 MMOL/L (ref 98–107)
CHOLEST SERPL-MCNC: 119 MG/DL (ref 0–199)
CHOLESTEROL/HDL RATIO: 1.9
CO2 SERPL-SCNC: 30 MMOL/L (ref 21–32)
CORTIS AM PEAK SERPL-MSCNC: 8.2 UG/DL (ref 5–20)
CREAT SERPL-MCNC: 0.75 MG/DL (ref 0.5–1.05)
DHEA-S SERPL-MCNC: 17 UG/DL (ref 13–130)
EGFRCR SERPLBLD CKD-EPI 2021: 90 ML/MIN/1.73M*2
EOSINOPHIL # BLD AUTO: 0.14 X10*3/UL (ref 0–0.7)
EOSINOPHIL NFR BLD AUTO: 2.8 %
ERYTHROCYTE [DISTWIDTH] IN BLOOD BY AUTOMATED COUNT: 13.3 % (ref 11.5–14.5)
GLUCOSE SERPL-MCNC: 89 MG/DL (ref 74–99)
HCT VFR BLD AUTO: 40.5 % (ref 36–46)
HDLC SERPL-MCNC: 63.1 MG/DL
HGB BLD-MCNC: 13.4 G/DL (ref 12–16)
IMM GRANULOCYTES # BLD AUTO: 0.01 X10*3/UL (ref 0–0.7)
IMM GRANULOCYTES NFR BLD AUTO: 0.2 % (ref 0–0.9)
IRON SATN MFR SERPL: 20 % (ref 25–45)
IRON SERPL-MCNC: 79 UG/DL (ref 35–150)
LDLC SERPL CALC-MCNC: 47 MG/DL
LYMPHOCYTES # BLD AUTO: 1.99 X10*3/UL (ref 1.2–4.8)
LYMPHOCYTES NFR BLD AUTO: 40.3 %
MCH RBC QN AUTO: 28.8 PG (ref 26–34)
MCHC RBC AUTO-ENTMCNC: 33.1 G/DL (ref 32–36)
MCV RBC AUTO: 87 FL (ref 80–100)
MONOCYTES # BLD AUTO: 0.35 X10*3/UL (ref 0.1–1)
MONOCYTES NFR BLD AUTO: 7.1 %
NEUTROPHILS # BLD AUTO: 2.42 X10*3/UL (ref 1.2–7.7)
NEUTROPHILS NFR BLD AUTO: 49 %
NON HDL CHOLESTEROL: 56 MG/DL (ref 0–149)
NRBC BLD-RTO: 0 /100 WBCS (ref 0–0)
PLATELET # BLD AUTO: 218 X10*3/UL (ref 150–450)
POTASSIUM SERPL-SCNC: 4.1 MMOL/L (ref 3.5–5.3)
PROT SERPL-MCNC: 6 G/DL (ref 6.4–8.2)
RBC # BLD AUTO: 4.66 X10*6/UL (ref 4–5.2)
SODIUM SERPL-SCNC: 142 MMOL/L (ref 136–145)
TIBC SERPL-MCNC: 403 UG/DL (ref 240–445)
TRIGL SERPL-MCNC: 47 MG/DL (ref 0–149)
UIBC SERPL-MCNC: 324 UG/DL (ref 110–370)
VLDL: 9 MG/DL (ref 0–40)
WBC # BLD AUTO: 4.9 X10*3/UL (ref 4.4–11.3)

## 2024-08-02 PROCEDURE — 83540 ASSAY OF IRON: CPT

## 2024-08-02 PROCEDURE — 83550 IRON BINDING TEST: CPT

## 2024-08-02 PROCEDURE — 80061 LIPID PANEL: CPT

## 2024-08-02 PROCEDURE — 85025 COMPLETE CBC W/AUTO DIFF WBC: CPT

## 2024-08-02 PROCEDURE — 82627 DEHYDROEPIANDROSTERONE: CPT

## 2024-08-02 PROCEDURE — 36415 COLL VENOUS BLD VENIPUNCTURE: CPT

## 2024-08-02 PROCEDURE — 82652 VIT D 1 25-DIHYDROXY: CPT

## 2024-08-02 PROCEDURE — 82306 VITAMIN D 25 HYDROXY: CPT

## 2024-08-02 PROCEDURE — 80053 COMPREHEN METABOLIC PANEL: CPT

## 2024-08-02 PROCEDURE — 82533 TOTAL CORTISOL: CPT

## 2024-08-04 LAB — 1,25(OH)2D SERPL-MCNC: 96.2 PG/ML (ref 19.9–79.3)

## 2024-08-20 DIAGNOSIS — E89.0 POSTOPERATIVE HYPOTHYROIDISM: ICD-10-CM

## 2024-08-20 DIAGNOSIS — C73 PAPILLARY THYROID CARCINOMA (MULTI): ICD-10-CM

## 2024-08-20 RX ORDER — LEVOTHYROXINE SODIUM 125 UG/1
125 TABLET ORAL DAILY
Qty: 30 TABLET | Refills: 5 | Status: CANCELLED | OUTPATIENT
Start: 2024-08-20 | End: 2025-02-16

## 2024-08-20 NOTE — TELEPHONE ENCOUNTER
"Patient need \"Synthroid\" 90 day supply resent, [facility] was unable to fill prescription because 30 day supply was sent.   "

## 2024-08-20 NOTE — TELEPHONE ENCOUNTER
(Next appt 12/16/2024)  Patient called to check status on Synthroid refill please send 90 day supply to [facility]

## 2024-08-21 DIAGNOSIS — C73 PAPILLARY THYROID CARCINOMA (MULTI): ICD-10-CM

## 2024-08-21 DIAGNOSIS — E89.0 POSTOPERATIVE HYPOTHYROIDISM: ICD-10-CM

## 2024-08-21 RX ORDER — LEVOTHYROXINE SODIUM 125 UG/1
125 TABLET ORAL DAILY
Qty: 90 TABLET | Refills: 1 | Status: SHIPPED | OUTPATIENT
Start: 2024-08-21 | End: 2025-02-17

## 2024-09-10 ASSESSMENT — ENCOUNTER SYMPTOMS
VOMITING: 0
SHORTNESS OF BREATH: 0
FATIGUE: 0
ADENOPATHY: 0
VOICE CHANGE: 0
APPETITE CHANGE: 0
NAUSEA: 0
COUGH: 0
NECK PAIN: 0
TROUBLE SWALLOWING: 0
SORE THROAT: 0
UNEXPECTED WEIGHT CHANGE: 0
FEVER: 0
STRIDOR: 0
CHILLS: 0
FACIAL SWELLING: 0

## 2024-09-10 NOTE — PROGRESS NOTES
WALLY Ponce is a 64 y.o. female here for follow up of her papillary thyroid carcinoma multifocal T3N1M0 s/p thyroidectomy and neck dissection on 11/22/23. Last seen 3/2024. She had her WEBER and her WBI scan 2/5/24  that showed no evidence of metastatic disease. Her TG was <0.1 on 7/22/24 with a TSH of 0.40. She is back on her synthroid daily and following with Dr. Evans. She reports she has been feeling very fatigued. She had lab work and her iron saturation was low at 20.  She is very concerned about this.  She continues to have heartbeat tinnitus in her ear but complete workup was negative.    History:  Dx: Papillary thyroid carcinoma pT3b (multifocal) N1M0  Dx: CLL  1/23: Noticed a small nodule in her right neck, inc size   9/23: CT neck +new cystic mass 5.2x4.1x5.6cm w/n +enhancing nodule 1.2 x 0.8 x 1.3 cm, abuts or arises from the right thyroid lobe. The thyroid gland is diffusely heterogeneous, right greater than left but can't see a discrete nodule.  There is an additional cystic appearing node at level 2 on the right measuring 1.5 x 0.8 cm.   10/20/23: FNA right thyroid mass +classic papillary thyroid carcinoma  11/22/23: S/p total thyroidectomy and right radical neck dissection - path multifocal T3bN1  12/15/23: TG 0.1 TSH 0.2  1/22/24: TG <0.1 TSH 2.6  1/30/24: TG 0.1 (Stimulated) TSH 31  1/31/24: WEBER 157mCu   2/5/24: WBI scan with sm residual thyroid bed otherwise negative  7/22/24: TG <0.1 TSH 0.4    ROS  Review of Systems   Constitutional:  Negative for appetite change, chills, fatigue, fever and unexpected weight change.   HENT:  Negative for dental problem, drooling, ear pain, facial swelling, hearing loss, mouth sores, sore throat, trouble swallowing and voice change.         +heartbeat in her left ear only   Respiratory:  Negative for cough, shortness of breath and stridor.    Gastrointestinal:  Negative for nausea and vomiting.   Musculoskeletal:  Negative for neck pain.   Hematological:   Negative for adenopathy.   All other systems reviewed and are negative.       PE  PHYSICAL EXAMINATION:  Constitutional:  No acute distress  Voice:  No hoarseness or other abnormality  Respiration:  Breathing comfortably, no stridor  Cardiovascular:  No clubbing/cyanosis/edema in hands  Eyes:  sclera normal  Neuro:  Alert and oriented times 3, Cranial nerves II-XII grossly intact and symmetric bilaterally  Head and Face:  Symmetric facial features, no masses or lesions,  Nose:  External nose midline  Oral Cavity/Oropharynx/Lips:  MMM  Neck/Lymph:  Neck scar is well healed, right side across her midline neck.  Not on left.  Deformity associated with radical neck as expected.  Psych:  Alert and oriented with appropriate mood and affect    ASSESSMENT AND PLAN  Problem List Items Addressed This Visit       Papillary thyroid carcinoma (Multi)    Current Assessment & Plan     No evidence of disease  TG undetectable  TSH suppressed  Follow up in 6 months         Postoperative hypothyroidism - Primary    Current Assessment & Plan     Well controlled on current dose of synthroid per Dr. Evans  Continue daily             Vandana Dalton MD    Head & Neck Surgical Oncology & Reconstruction  Department of Otolaryngology - Head and Neck Surgery        By signing my name below, I, Chloe Castañeda, Scribe, attest that this documentation has been prepared under the direction and in the presence of Dr. Vandana Dalton MD.     All medical record entries made by the Scribe were at my direction and personally dictated by me, Dr. Vandana Dalton. I have reviewed the chart and agree that the record accurately reflects my personal performance of the history, physical exam, discussion and plan.

## 2024-09-16 ENCOUNTER — APPOINTMENT (OUTPATIENT)
Dept: OTOLARYNGOLOGY | Facility: CLINIC | Age: 64
End: 2024-09-16
Payer: COMMERCIAL

## 2024-09-16 VITALS — WEIGHT: 254 LBS | HEIGHT: 66 IN | BODY MASS INDEX: 40.82 KG/M2 | TEMPERATURE: 98.6 F

## 2024-09-16 DIAGNOSIS — C73 PAPILLARY THYROID CARCINOMA (MULTI): ICD-10-CM

## 2024-09-16 DIAGNOSIS — E89.0 POSTOPERATIVE HYPOTHYROIDISM: Primary | ICD-10-CM

## 2024-09-16 PROCEDURE — 3008F BODY MASS INDEX DOCD: CPT | Performed by: OTOLARYNGOLOGY

## 2024-09-16 PROCEDURE — 99213 OFFICE O/P EST LOW 20 MIN: CPT | Performed by: OTOLARYNGOLOGY

## 2024-09-16 RX ORDER — FERROUS GLUCONATE 325 MG
38 TABLET ORAL
COMMUNITY

## 2024-09-16 ASSESSMENT — PATIENT HEALTH QUESTIONNAIRE - PHQ9
2. FEELING DOWN, DEPRESSED OR HOPELESS: NOT AT ALL
SUM OF ALL RESPONSES TO PHQ9 QUESTIONS 1 AND 2: 0
1. LITTLE INTEREST OR PLEASURE IN DOING THINGS: NOT AT ALL

## 2024-09-16 NOTE — PATIENT INSTRUCTIONS
Dr. Dalton evaluated you today.    Your care plan is outlined below:  -- Follow up with Dr. Dalton in 1 year.  This appointment was scheduled at the end of your visit today.  If you need to reschedule, please call the office at [phone].  Please keep in mind that last minute cancellations often result in delayed follow-up appointments.     General appointment line please call [phone]  For general questions or scheduling issues please call [phone] option #2   For medical questions or surgery scheduling please call [phone] on Mondays, Wednesdays and Thursdays or [phone] on Tuesdays and Fridays. Please be sure to leave a voice mail or your call will not be able to be returned.     Dr. Dalton makes every effort to run on time for your appointments.  Therefore, if you are more than 30 minutes late for your appointment, unrelated to a scan or another appointment such as chemotherapy or radiation, your appointment will need to be rescheduled to another day.  We appreciate your understanding.

## 2024-10-20 RX ORDER — LEVOTHYROXINE SODIUM 125 UG/1
125 TABLET ORAL DAILY
Qty: 90 TABLET | Refills: 1 | Status: SHIPPED | OUTPATIENT
Start: 2024-10-20 | End: 2025-04-18

## 2024-11-25 ENCOUNTER — LAB (OUTPATIENT)
Dept: LAB | Facility: LAB | Age: 64
End: 2024-11-25
Payer: COMMERCIAL

## 2024-11-25 DIAGNOSIS — C91.10 CHRONIC LYMPHOCYTIC LEUKEMIA OF B-CELL TYPE NOT HAVING ACHIEVED REMISSION (MULTI): Primary | ICD-10-CM

## 2024-11-25 DIAGNOSIS — R73.01 IMPAIRED FASTING GLUCOSE: ICD-10-CM

## 2024-11-25 DIAGNOSIS — Z00.00 ENCOUNTER FOR GENERAL ADULT MEDICAL EXAMINATION WITHOUT ABNORMAL FINDINGS: ICD-10-CM

## 2024-11-25 LAB
25(OH)D3 SERPL-MCNC: 43 NG/ML (ref 30–100)
ALBUMIN SERPL BCP-MCNC: 4.2 G/DL (ref 3.4–5)
ALP SERPL-CCNC: 80 U/L (ref 33–136)
ALT SERPL W P-5'-P-CCNC: 49 U/L (ref 7–45)
ANION GAP SERPL CALC-SCNC: 9 MMOL/L (ref 10–20)
AST SERPL W P-5'-P-CCNC: 25 U/L (ref 9–39)
BASOPHILS # BLD AUTO: 0.04 X10*3/UL (ref 0–0.1)
BASOPHILS NFR BLD AUTO: 0.7 %
BILIRUB SERPL-MCNC: 0.4 MG/DL (ref 0–1.2)
BUN SERPL-MCNC: 16 MG/DL (ref 6–23)
CALCIUM SERPL-MCNC: 9.3 MG/DL (ref 8.6–10.3)
CHLORIDE SERPL-SCNC: 106 MMOL/L (ref 98–107)
CO2 SERPL-SCNC: 28 MMOL/L (ref 21–32)
CREAT SERPL-MCNC: 0.66 MG/DL (ref 0.5–1.05)
EGFRCR SERPLBLD CKD-EPI 2021: >90 ML/MIN/1.73M*2
EOSINOPHIL # BLD AUTO: 0.17 X10*3/UL (ref 0–0.7)
EOSINOPHIL NFR BLD AUTO: 2.9 %
ERYTHROCYTE [DISTWIDTH] IN BLOOD BY AUTOMATED COUNT: 13.1 % (ref 11.5–14.5)
GLUCOSE SERPL-MCNC: 88 MG/DL (ref 74–99)
HCT VFR BLD AUTO: 43.1 % (ref 36–46)
HGB BLD-MCNC: 13.9 G/DL (ref 12–16)
IMM GRANULOCYTES # BLD AUTO: 0.02 X10*3/UL (ref 0–0.7)
IMM GRANULOCYTES NFR BLD AUTO: 0.3 % (ref 0–0.9)
LYMPHOCYTES # BLD AUTO: 2.39 X10*3/UL (ref 1.2–4.8)
LYMPHOCYTES NFR BLD AUTO: 40.2 %
MCH RBC QN AUTO: 28.1 PG (ref 26–34)
MCHC RBC AUTO-ENTMCNC: 32.3 G/DL (ref 32–36)
MCV RBC AUTO: 87 FL (ref 80–100)
MONOCYTES # BLD AUTO: 0.45 X10*3/UL (ref 0.1–1)
MONOCYTES NFR BLD AUTO: 7.6 %
NEUTROPHILS # BLD AUTO: 2.88 X10*3/UL (ref 1.2–7.7)
NEUTROPHILS NFR BLD AUTO: 48.3 %
NRBC BLD-RTO: 0 /100 WBCS (ref 0–0)
PLATELET # BLD AUTO: 219 X10*3/UL (ref 150–450)
POTASSIUM SERPL-SCNC: 4.1 MMOL/L (ref 3.5–5.3)
PROT SERPL-MCNC: 6.5 G/DL (ref 6.4–8.2)
RBC # BLD AUTO: 4.95 X10*6/UL (ref 4–5.2)
SODIUM SERPL-SCNC: 139 MMOL/L (ref 136–145)
WBC # BLD AUTO: 6 X10*3/UL (ref 4.4–11.3)

## 2024-11-25 PROCEDURE — 80053 COMPREHEN METABOLIC PANEL: CPT

## 2024-11-25 PROCEDURE — 85025 COMPLETE CBC W/AUTO DIFF WBC: CPT

## 2024-11-25 PROCEDURE — 82306 VITAMIN D 25 HYDROXY: CPT

## 2024-11-25 PROCEDURE — 36415 COLL VENOUS BLD VENIPUNCTURE: CPT

## 2024-11-26 ENCOUNTER — LAB (OUTPATIENT)
Dept: LAB | Facility: LAB | Age: 64
End: 2024-11-26
Payer: COMMERCIAL

## 2024-11-26 DIAGNOSIS — C91.10 CHRONIC LYMPHOCYTIC LEUKEMIA OF B-CELL TYPE NOT HAVING ACHIEVED REMISSION (MULTI): ICD-10-CM

## 2024-11-26 LAB
IRON SATN MFR SERPL: 24 % (ref 25–45)
IRON SERPL-MCNC: 94 UG/DL (ref 35–150)
TIBC SERPL-MCNC: 386 UG/DL (ref 240–445)
UIBC SERPL-MCNC: 292 UG/DL (ref 110–370)

## 2024-11-26 PROCEDURE — 36415 COLL VENOUS BLD VENIPUNCTURE: CPT

## 2024-11-26 PROCEDURE — 83540 ASSAY OF IRON: CPT

## 2024-11-26 PROCEDURE — 83550 IRON BINDING TEST: CPT

## 2024-12-12 ENCOUNTER — PATIENT MESSAGE (OUTPATIENT)
Dept: ENDOCRINOLOGY | Facility: CLINIC | Age: 64
End: 2024-12-12
Payer: COMMERCIAL

## 2024-12-12 ENCOUNTER — TELEPHONE (OUTPATIENT)
Dept: ENDOCRINOLOGY | Facility: CLINIC | Age: 64
End: 2024-12-12
Payer: COMMERCIAL

## 2024-12-16 ENCOUNTER — LAB (OUTPATIENT)
Dept: LAB | Facility: LAB | Age: 64
End: 2024-12-16
Payer: COMMERCIAL

## 2024-12-16 ENCOUNTER — APPOINTMENT (OUTPATIENT)
Dept: ENDOCRINOLOGY | Facility: CLINIC | Age: 64
End: 2024-12-16
Payer: COMMERCIAL

## 2024-12-16 VITALS
WEIGHT: 253 LBS | HEART RATE: 75 BPM | SYSTOLIC BLOOD PRESSURE: 102 MMHG | BODY MASS INDEX: 40.66 KG/M2 | HEIGHT: 66 IN | DIASTOLIC BLOOD PRESSURE: 72 MMHG

## 2024-12-16 DIAGNOSIS — C73 PAPILLARY THYROID CARCINOMA (MULTI): ICD-10-CM

## 2024-12-16 DIAGNOSIS — E89.0 POSTOPERATIVE HYPOTHYROIDISM: ICD-10-CM

## 2024-12-16 LAB
T4 FREE SERPL-MCNC: 1.47 NG/DL (ref 0.61–1.12)
TSH SERPL-ACNC: 0.02 MIU/L (ref 0.44–3.98)

## 2024-12-16 PROCEDURE — 3008F BODY MASS INDEX DOCD: CPT | Performed by: INTERNAL MEDICINE

## 2024-12-16 PROCEDURE — 84443 ASSAY THYROID STIM HORMONE: CPT

## 2024-12-16 PROCEDURE — 86800 THYROGLOBULIN ANTIBODY: CPT

## 2024-12-16 PROCEDURE — 99213 OFFICE O/P EST LOW 20 MIN: CPT | Performed by: INTERNAL MEDICINE

## 2024-12-16 PROCEDURE — 84439 ASSAY OF FREE THYROXINE: CPT

## 2024-12-16 PROCEDURE — 84432 ASSAY OF THYROGLOBULIN: CPT

## 2024-12-16 PROCEDURE — 36415 COLL VENOUS BLD VENIPUNCTURE: CPT

## 2024-12-16 RX ORDER — CITALOPRAM 10 MG/1
10 TABLET ORAL DAILY
COMMUNITY

## 2024-12-16 RX ORDER — ASCORBIC ACID 500 MG/5ML
SYRUP ORAL DAILY
COMMUNITY

## 2024-12-16 NOTE — PROGRESS NOTES
Subjective   Ms. Amita Ponce is a 64-year-old female who presents for follow up for metastatic papillary thyroid cancer.      In January 2023, she noticed a small nodule in her right neck which increased in size.  CT scan of the neck was obtained in September 2023 where she was found to have a new cystic mass measuring 5.2 x 4.1 x 5.6cm within an enhancing nodule 1.2 x 0.8 x 1.3 cm, abuts or arising from the right thyroid lobe. The thyroid gland is diffusely heterogeneous, right greater than left but can't see a discrete nodule.  There is an additional cystic appearing node at level 2 on the right measuring 1.5 x 0.8 cm.     She underwent a FNAB of the right mass on October 20, 2023.  Cytology revealed classic papillary thyroid carcinoma.    She underwent a total thyroidectomy and right radical neck dissection on November 22, 2023.      Surgical pathology revealed:  A. No pathological diagnosis: Parathyroid (right inferior) biopsy     B. 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 Classic papillary carcinoma, 6.5 cm, with extensive hemorrhage and extrathyroidal extension;   underlying follicular nodular disease Thyroid       Metastatic papillary thyroid carcinoma: Lymph nodes, 5 of 21             - right hemithyroidectomy and neck dissection specimen     D. Metastatic papillary thyroid carcinoma, largest 0.3 cm:   Lymph nodes, 4 of 12 (central neck) dissection specimen     E. Papillary carcinoma, 1.0 cm, and follicular nodular disease:   Thyroid, left completion thyroidectomy specimen      She underwent radioactive iodine therapy with 157mCI on January 31, 2024.    Whole-body scan from February 5, 2024 revealed:  IMPRESSION:  1. Recent high dose radioiodine (I-131) therapy for thyroid carcinoma.  2. Focal radiotracer uptake within the surgical bed is consistent  with residual disease.  3. No evidence of radiotracer uptake elsewhere  "throughout the body to  suggest metastatic disease.    She has had no major changes to her health since her last appointment.  She has been feeling better.  She is taking an iron supplement every other day. She has been using Magnesium oil on her knees which has improved aches     She will be losing insurance as her  lost his job.      Current Regimen  Synthroid 125mcg po daily     Symptoms related to thyroid disease are as listed below:  Energy levels -  fatigued   Bowel movements -  every other day   Palpitations - denies   Tremors - denies   Mood - focus is harder; hard to make decisiosn   Weight changes -  lost with milk, berberine and [place]; eats once daily; no carbs     Review of Systems   All other systems reviewed and are negative.    Objective   /72 (BP Location: Left arm, Patient Position: Sitting, BP Cuff Size: Adult)   Pulse 75   Ht 1.676 m (5' 6\")   Wt 115 kg (253 lb)   BMI 40.84 kg/m²    Physical Exam  Vitals and nursing note reviewed.   Constitutional:       General: She is not in acute distress.     Appearance: Normal appearance. She is obese.   HENT:      Head: Normocephalic and atraumatic.      Nose: Nose normal.      Mouth/Throat:      Mouth: Mucous membranes are moist.   Eyes:      Extraocular Movements: Extraocular movements intact.   Neck:      Comments: Healed thyroidectomy scar and right neck scar   Cardiovascular:      Rate and Rhythm: Normal rate and regular rhythm.   Pulmonary:      Effort: Pulmonary effort is normal.      Breath sounds: Normal breath sounds.   Musculoskeletal:         General: Normal range of motion.   Skin:     General: Skin is warm and dry.   Neurological:      Mental Status: She is alert and oriented to person, place, and time.         Lab Results   Component Value Date    TSH 0.40 (L) 07/22/2024    FREET4 1.06 07/22/2024     === 10/20/23 ===    US GUIDED THYROID BIOPSY    - Impression -  Technically successful ultrasound-guided fine-needle " aspiration, core  biopsy as well as fluid aspiration of a solid and cystic thyroid  nodule in the right lobe of the thyroid gland.    Signed by: Tara Peralta 10/21/2023 11:23 AM  Dictation workstation:   [id number]    === 09/08/23 ===    CT SOFT TISSUE NECK W IV CONTRAST    - Impression -  There is a new cystic mass with an enhancing nodule in the right  lower neck measuring 5.2 x 4.1 x 5.6 cm. The enhancing nodule  measures 1.2 x 0.8 x 1.3 cm. The inferior component of the mass  either abuts or arises from the right thyroid lobe. The thyroid gland  is diffusely heterogeneous, right greater than left. Consider  correlation with tissue sampling.    There is an additional cystic appearing node at level 2 on the right  measuring 1.5 x 0.8 cm.    There are bilateral prominent lymph nodes decreased in size since the  prior exam 10/25/2012 compatible with the patient's history of  lymphoma.      Assessment/Plan   64-year-old female presents for follow up for metastatic papillary thyroid cancer.  She underwent a total thyroidectomy and right radical neck dissection on November 22, 2023.  She was found to have Stage II disease with [id number].  She underwent radioactive iodine therapy with 157mCI on January 31, 2024.    Postoperative hypothyroidism  To continue Synthroid 125mcg po daily   Take Synthroid on an empty stomach with water alone, 30-60 minutes before eating or taking other medications, 4 hours before any calcium or iron supplement  Counseled that thyroid requirements will be decreased with weight loss   Counseled regarding the symptoms of thyrotoxicosis should further weight loss occur      Papillary thyroid carcinoma (Multi)  To obtain TG and anti-TG values    For follow up in 6 months

## 2024-12-16 NOTE — PATIENT INSTRUCTIONS
Thank you for choosing [facility]  for your health care needs.  If you have any questions, concerns or medical needs, please feel free to contact our office at [phone].    Please ensure you complete your blood work one week before the next scheduled appointment.    To continue Synthroid 125mcg po daily   Take Synthroid on an empty stomach with water alone, 30-60 minutes before eating or taking other medications, 4 hours before any calcium or iron supplement  Counseled that thyroid requirements will be decreased with weight loss   Counseled regarding the symptoms of thyrotoxicosis should further weight loss occur  For follow up in 6 months

## 2024-12-18 LAB
THYROGLOB AB SERPL-ACNC: <1 IU/ML (ref 0–0.9)
THYROGLOB SERPL-MCNC: <0.1 NG/ML (ref 1.5–38.5)

## 2024-12-19 DIAGNOSIS — R94.5 ABNORMAL RESULTS OF LIVER FUNCTION STUDIES: ICD-10-CM

## 2024-12-19 DIAGNOSIS — E55.9 VITAMIN D DEFICIENCY, UNSPECIFIED: ICD-10-CM

## 2024-12-19 DIAGNOSIS — D50.8 OTHER IRON DEFICIENCY ANEMIAS: ICD-10-CM

## 2024-12-19 DIAGNOSIS — C91.10 CHRONIC LYMPHOCYTIC LEUKEMIA OF B-CELL TYPE NOT HAVING ACHIEVED REMISSION (MULTI): Primary | ICD-10-CM

## 2024-12-19 DIAGNOSIS — R73.01 IMPAIRED FASTING GLUCOSE: ICD-10-CM

## 2024-12-19 DIAGNOSIS — D64.9 ANEMIA, UNSPECIFIED: ICD-10-CM

## 2024-12-19 RX ORDER — LEVOTHYROXINE SODIUM 112 UG/1
112 TABLET ORAL DAILY
Qty: 90 TABLET | Refills: 1 | Status: SHIPPED | OUTPATIENT
Start: 2024-12-19 | End: 2025-06-17

## 2024-12-19 NOTE — RESULT ENCOUNTER NOTE
Labs have been reviewed.  The tumor marker is undetectable.  The thyroid level is too high given weight loss.  Please reduce Synthroid to 112mcg po daily.

## 2024-12-19 NOTE — ASSESSMENT & PLAN NOTE
To continue Synthroid 125mcg po daily   Take Synthroid on an empty stomach with water alone, 30-60 minutes before eating or taking other medications, 4 hours before any calcium or iron supplement  Counseled that thyroid requirements will be decreased with weight loss   Counseled regarding the symptoms of thyrotoxicosis should further weight loss occur

## 2025-06-10 ENCOUNTER — TELEPHONE (OUTPATIENT)
Dept: OTOLARYNGOLOGY | Facility: HOSPITAL | Age: 65
End: 2025-06-10
Payer: COMMERCIAL

## 2025-06-10 NOTE — TELEPHONE ENCOUNTER
Left a message for patient letting her know I was returning her call. We will try here again tomorrow if we don't hear back today.

## 2025-06-10 NOTE — TELEPHONE ENCOUNTER
----- Message from Jen AYALA sent at 6/10/2025  3:21 PM EDT -----  Regarding: call back  Patient would like a call back has a question regarding the surgery she had in 2024

## 2025-06-16 ENCOUNTER — APPOINTMENT (OUTPATIENT)
Dept: ENDOCRINOLOGY | Facility: CLINIC | Age: 65
End: 2025-06-16
Payer: COMMERCIAL

## 2025-06-20 ASSESSMENT — ENCOUNTER SYMPTOMS
STRIDOR: 0
SORE THROAT: 0
VOMITING: 0
NAUSEA: 0
FEVER: 0
APPETITE CHANGE: 0
UNEXPECTED WEIGHT CHANGE: 0
FATIGUE: 0
CHILLS: 0
VOICE CHANGE: 0
ADENOPATHY: 0
NECK PAIN: 0
TROUBLE SWALLOWING: 0
SHORTNESS OF BREATH: 0
COUGH: 0
FACIAL SWELLING: 0

## 2025-06-20 NOTE — PROGRESS NOTES
HPI  Amita Ponce is a 65 y.o. female here for follow up of her papillary thyroid carcinoma multifocal T3N1M0 s/p thyroidectomy and neck dissection on 11/22/23. Last seen 9/2024. She had her WEBER and her WBI scan 2/5/24  that showed no evidence of metastatic disease.     History:  Dx: Papillary thyroid carcinoma pT3b (multifocal) N1M0  Dx: CLL  1/23: Noticed a small nodule in her right neck, inc size   9/23: CT neck +new cystic mass 5.2x4.1x5.6cm w/n +enhancing nodule 1.2 x 0.8 x 1.3 cm, abuts or arises from the right thyroid lobe. The thyroid gland is diffusely heterogeneous, right greater than left but can't see a discrete nodule.  There is an additional cystic appearing node at level 2 on the right measuring 1.5 x 0.8 cm.   10/20/23: FNA right thyroid mass +classic papillary thyroid carcinoma  11/22/23: S/p total thyroidectomy and right radical neck dissection - path multifocal T3bN1  12/15/23: TG 0.1 TSH 0.2  1/22/24: TG <0.1 TSH 2.6  1/30/24: TG 0.1 (Stimulated) TSH 31  1/31/24: WEBER 157mCu   2/5/24: WBI scan with sm residual thyroid bed otherwise negative  7/22/24: TG <0.1 TSH 0.4    ROS  Review of Systems   Constitutional:  Negative for appetite change, chills, fatigue, fever and unexpected weight change.   HENT:  Negative for dental problem, drooling, ear pain, facial swelling, hearing loss, mouth sores, sore throat, trouble swallowing and voice change.         +heartbeat in her left ear only   Respiratory:  Negative for cough, shortness of breath and stridor.    Gastrointestinal:  Negative for nausea and vomiting.   Musculoskeletal:  Negative for neck pain.   Hematological:  Negative for adenopathy.   All other systems reviewed and are negative.       PE  PHYSICAL EXAMINATION:  Constitutional:  No acute distress  Voice:  No hoarseness or other abnormality  Respiration:  Breathing comfortably, no stridor  Cardiovascular:  No clubbing/cyanosis/edema in hands  Eyes:  sclera normal  Neuro:  Alert and oriented  times 3, Cranial nerves II-XII grossly intact and symmetric bilaterally  Head and Face:  Symmetric facial features, no masses or lesions,  Nose:  External nose midline  Oral Cavity/Oropharynx/Lips:  MMM  Neck/Lymph:  Neck scar is well healed, right side across her midline neck.  Not on left.  Deformity associated with radical neck as expected.  Psych:  Alert and oriented with appropriate mood and affect    ASSESSMENT AND PLAN  Problem List Items Addressed This Visit    None        Vandana Dalton MD    Head & Neck Surgical Oncology & Reconstruction  Department of Otolaryngology - Head and Neck Surgery        By signing my name below, I, Snehal Brennanibe, attest that this documentation has been prepared under the direction and in the presence of Dr. Vandana Dalton MD.     All medical record entries made by the Scribe were at my direction and personally dictated by me, Dr. Vandana Dalton. I have reviewed the chart and agree that the record accurately reflects my personal performance of the history, physical exam, discussion and plan.

## 2025-06-30 ENCOUNTER — APPOINTMENT (OUTPATIENT)
Dept: OTOLARYNGOLOGY | Facility: CLINIC | Age: 65
End: 2025-06-30
Payer: COMMERCIAL

## 2025-06-30 VITALS — BODY MASS INDEX: 39.53 KG/M2 | TEMPERATURE: 98.6 F | WEIGHT: 246 LBS | HEIGHT: 66 IN

## 2025-06-30 DIAGNOSIS — E89.0 POSTOPERATIVE HYPOTHYROIDISM: ICD-10-CM

## 2025-06-30 DIAGNOSIS — C77.0 SECONDARY AND UNSPECIFIED MALIGNANT NEOPLASM OF LYMPH NODES OF HEAD, FACE AND NECK: ICD-10-CM

## 2025-06-30 DIAGNOSIS — C73 PAPILLARY THYROID CARCINOMA: Primary | ICD-10-CM

## 2025-06-30 PROCEDURE — 1036F TOBACCO NON-USER: CPT | Performed by: OTOLARYNGOLOGY

## 2025-06-30 PROCEDURE — 99213 OFFICE O/P EST LOW 20 MIN: CPT | Performed by: OTOLARYNGOLOGY

## 2025-06-30 PROCEDURE — 3008F BODY MASS INDEX DOCD: CPT | Performed by: OTOLARYNGOLOGY

## 2025-06-30 NOTE — PATIENT INSTRUCTIONS
Dr. Dalton evaluated you today.    Your care plan is outlined below:  -- Dr. Moncho Jeffers neurosurgeon     General appointment line please call [phone]  For general questions or scheduling issues please call [phone] option #2   For medical questions or surgery scheduling please call [phone] on Mondays, Wednesdays and Thursdays or [phone] on Tuesdays and Fridays. Please be sure to leave a voice mail or your call will not be able to be returned.

## 2025-06-30 NOTE — ASSESSMENT & PLAN NOTE
No evidence of disease  Exam is negative  Labwork is negative  She is doing well  Reassurance provided  Questions about surgical clips answered  Follow up as needed, I am leaving [facility] but assured her that my replacement is an excellent surgeon and will be here for if needed

## 2025-09-15 ENCOUNTER — APPOINTMENT (OUTPATIENT)
Dept: OTOLARYNGOLOGY | Facility: CLINIC | Age: 65
End: 2025-09-15
Payer: COMMERCIAL

## (undated) DEVICE — COVER, CART, 45 X 27 X 48 IN, CLEAR

## (undated) DEVICE — CLIP, LIGATING, W/ADHESIVE PAD, MEDIUM, TITANIUM

## (undated) DEVICE — SPONGE, LAP, XRAY DECT, 12IN X 12IN, W/MASTER DMT, STERILE

## (undated) DEVICE — Device

## (undated) DEVICE — CATHETER TRAY, SURESTEP, 16FR, URINE METER W/STATLOCK

## (undated) DEVICE — EVACUATOR, WOUND, SUCTION, CLOSED, JACKSON-PRATT, 100 CC, SILICONE

## (undated) DEVICE — SUTURE, SILK, 2-0, 30 IN, SH, BLACK

## (undated) DEVICE — SPONGE, HEMOSTATIC, CELLULOSE, SURGICEL, FIBRILLAR, 2 X 4 IN

## (undated) DEVICE — DRAIN, WOUND, FLAT, HUBLESS, FULL LENGTH PERFORATION, 10 MM X 20 CM, SILICONE

## (undated) DEVICE — STAY SET, SURGICAL, 5MM SHARP HOOK, 8PK

## (undated) DEVICE — SUTURE, PROLENE, 2-0, 30 IN, SH, BLUE

## (undated) DEVICE — MANIFOLD, 4 PORT NEPTUNE STANDARD

## (undated) DEVICE — SPONGE, DISSECTOR, PEANUT, 3/8, STERILE 5 FOAM HOLDER"

## (undated) DEVICE — HOLSTER, JET SAFETY

## (undated) DEVICE — CLIP, LIGATING, W/ADHESIVE, WIDE SLOT, SMALL, TITANIUM

## (undated) DEVICE — REST, HEAD, BAGEL, 9 IN

## (undated) DEVICE — SUTURE, SILK, 2, BLACK BR, SUTUPAK, LF

## (undated) DEVICE — SUTURE, PLAIN, 5-0, 18 IN, PC1, YELLOW

## (undated) DEVICE — SUTURE, VICRYL, 3-0,18 IN, SH, UNDYED